# Patient Record
Sex: MALE | Race: WHITE | NOT HISPANIC OR LATINO | Employment: OTHER | URBAN - METROPOLITAN AREA
[De-identification: names, ages, dates, MRNs, and addresses within clinical notes are randomized per-mention and may not be internally consistent; named-entity substitution may affect disease eponyms.]

---

## 2017-02-22 ENCOUNTER — HOSPITAL ENCOUNTER (EMERGENCY)
Facility: HOSPITAL | Age: 82
Discharge: HOME | End: 2017-02-22
Attending: EMERGENCY MEDICINE | Admitting: EMERGENCY MEDICINE

## 2017-02-22 ENCOUNTER — APPOINTMENT (OUTPATIENT)
Dept: GENERAL RADIOLOGY | Facility: HOSPITAL | Age: 82
End: 2017-02-22

## 2017-02-22 ENCOUNTER — APPOINTMENT (OUTPATIENT)
Dept: CT IMAGING | Facility: HOSPITAL | Age: 82
End: 2017-02-22

## 2017-02-22 ENCOUNTER — APPOINTMENT (OUTPATIENT)
Dept: MRI IMAGING | Facility: HOSPITAL | Age: 82
End: 2017-02-22

## 2017-02-22 VITALS
SYSTOLIC BLOOD PRESSURE: 162 MMHG | BODY MASS INDEX: 25.18 KG/M2 | HEIGHT: 69 IN | OXYGEN SATURATION: 97 % | DIASTOLIC BLOOD PRESSURE: 74 MMHG | WEIGHT: 170 LBS | RESPIRATION RATE: 18 BRPM | TEMPERATURE: 97.6 F | HEART RATE: 65 BPM

## 2017-02-22 DIAGNOSIS — R53.1 WEAKNESS: ICD-10-CM
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
## 2017-02-22 DIAGNOSIS — R42 DIZZINESS: Primary | ICD-10-CM

## 2017-02-22 LAB
ALBUMIN SERPL-MCNC: 4.4 G/DL (ref 3.2–4.8)
ALBUMIN/GLOB SERPL: 1.7 G/DL (ref 1.5–2.5)
ALP SERPL-CCNC: 48 U/L (ref 25–100)
ALT SERPL W P-5'-P-CCNC: 13 U/L (ref 7–40)
ANION GAP SERPL CALCULATED.3IONS-SCNC: 7 MMOL/L (ref 3–11)
AST SERPL-CCNC: 16 U/L (ref 0–33)
BASOPHILS # BLD AUTO: 0.04 10*3/MM3 (ref 0–0.2)
BASOPHILS NFR BLD AUTO: 0.4 % (ref 0–1)
BILIRUB SERPL-MCNC: 0.6 MG/DL (ref 0.3–1.2)
BILIRUB UR QL STRIP: NEGATIVE
BUN BLD-MCNC: 13 MG/DL (ref 9–23)
BUN/CREAT SERPL: 14.4 (ref 7–25)
CALCIUM SPEC-SCNC: 9.7 MG/DL (ref 8.7–10.4)
CHLORIDE SERPL-SCNC: 100 MMOL/L (ref 99–109)
CLARITY UR: CLEAR
CO2 SERPL-SCNC: 27 MMOL/L (ref 20–31)
COLOR UR: YELLOW
CREAT BLD-MCNC: 0.9 MG/DL (ref 0.6–1.3)
DEPRECATED RDW RBC AUTO: 43 FL (ref 37–54)
EOSINOPHIL # BLD AUTO: 0.13 10*3/MM3 (ref 0.1–0.3)
EOSINOPHIL NFR BLD AUTO: 1.4 % (ref 0–3)
ERYTHROCYTE [DISTWIDTH] IN BLOOD BY AUTOMATED COUNT: 13.2 % (ref 11.3–14.5)
GFR SERPL CREATININE-BSD FRML MDRD: 81 ML/MIN/1.73
GLOBULIN UR ELPH-MCNC: 2.6 GM/DL
GLUCOSE BLD-MCNC: 216 MG/DL (ref 70–100)
GLUCOSE BLDC GLUCOMTR-MCNC: 144 MG/DL (ref 70–130)
GLUCOSE BLDC GLUCOMTR-MCNC: 99 MG/DL (ref 70–130)
GLUCOSE UR STRIP-MCNC: NEGATIVE MG/DL
HCT VFR BLD AUTO: 36.8 % (ref 38.9–50.9)
HGB BLD-MCNC: 12.7 G/DL (ref 13.1–17.5)
HGB UR QL STRIP.AUTO: NEGATIVE
HOLD SPECIMEN: NORMAL
HOLD SPECIMEN: NORMAL
IMM GRANULOCYTES # BLD: 0.02 10*3/MM3 (ref 0–0.03)
IMM GRANULOCYTES NFR BLD: 0.2 % (ref 0–0.6)
KETONES UR QL STRIP: NEGATIVE
LEUKOCYTE ESTERASE UR QL STRIP.AUTO: NEGATIVE
LYMPHOCYTES # BLD AUTO: 1.92 10*3/MM3 (ref 0.6–4.8)
LYMPHOCYTES NFR BLD AUTO: 21.3 % (ref 24–44)
MAGNESIUM SERPL-MCNC: 1.6 MG/DL (ref 1.3–2.7)
MCH RBC QN AUTO: 31 PG (ref 27–31)
MCHC RBC AUTO-ENTMCNC: 34.5 G/DL (ref 32–36)
MCV RBC AUTO: 89.8 FL (ref 80–99)
MONOCYTES # BLD AUTO: 0.54 10*3/MM3 (ref 0–1)
MONOCYTES NFR BLD AUTO: 6 % (ref 0–12)
NEUTROPHILS # BLD AUTO: 6.38 10*3/MM3 (ref 1.5–8.3)
NEUTROPHILS NFR BLD AUTO: 70.7 % (ref 41–71)
NITRITE UR QL STRIP: NEGATIVE
PH UR STRIP.AUTO: 5.5 [PH] (ref 5–8)
PLATELET # BLD AUTO: 237 10*3/MM3 (ref 150–450)
PMV BLD AUTO: 9.6 FL (ref 6–12)
POTASSIUM BLD-SCNC: 4.3 MMOL/L (ref 3.5–5.5)
PROT SERPL-MCNC: 7 G/DL (ref 5.7–8.2)
PROT UR QL STRIP: NEGATIVE
RBC # BLD AUTO: 4.1 10*6/MM3 (ref 4.2–5.76)
SODIUM BLD-SCNC: 134 MMOL/L (ref 132–146)
SP GR UR STRIP: 1.01 (ref 1–1.03)
TROPONIN I SERPL-MCNC: 0 NG/ML (ref 0–0.07)
TROPONIN I SERPL-MCNC: 0 NG/ML (ref 0–0.07)
UROBILINOGEN UR QL STRIP: NORMAL
WBC NRBC COR # BLD: 9.03 10*3/MM3 (ref 3.5–10.8)
WHOLE BLOOD HOLD SPECIMEN: NORMAL
WHOLE BLOOD HOLD SPECIMEN: NORMAL

## 2017-02-22 PROCEDURE — 82962 GLUCOSE BLOOD TEST: CPT

## 2017-02-22 PROCEDURE — 70450 CT HEAD/BRAIN W/O DYE: CPT

## 2017-02-22 PROCEDURE — 81003 URINALYSIS AUTO W/O SCOPE: CPT | Performed by: EMERGENCY MEDICINE

## 2017-02-22 PROCEDURE — 93005 ELECTROCARDIOGRAM TRACING: CPT | Performed by: EMERGENCY MEDICINE

## 2017-02-22 PROCEDURE — 85025 COMPLETE CBC W/AUTO DIFF WBC: CPT | Performed by: EMERGENCY MEDICINE

## 2017-02-22 PROCEDURE — 99285 EMERGENCY DEPT VISIT HI MDM: CPT

## 2017-02-22 PROCEDURE — 71010 HC CHEST PA OR AP: CPT

## 2017-02-22 PROCEDURE — 93005 ELECTROCARDIOGRAM TRACING: CPT

## 2017-02-22 PROCEDURE — 83735 ASSAY OF MAGNESIUM: CPT | Performed by: EMERGENCY MEDICINE

## 2017-02-22 PROCEDURE — 84484 ASSAY OF TROPONIN QUANT: CPT

## 2017-02-22 PROCEDURE — 80053 COMPREHEN METABOLIC PANEL: CPT | Performed by: EMERGENCY MEDICINE

## 2017-02-22 PROCEDURE — 70551 MRI BRAIN STEM W/O DYE: CPT

## 2017-02-22 RX ORDER — MECLIZINE HYDROCHLORIDE 25 MG/1
25 TABLET ORAL ONCE
Status: DISCONTINUED | OUTPATIENT
Start: 2017-02-22 | End: 2017-02-22 | Stop reason: HOSPADM

## 2017-02-22 RX ORDER — SODIUM CHLORIDE 0.9 % (FLUSH) 0.9 %
10 SYRINGE (ML) INJECTION AS NEEDED
Status: DISCONTINUED | OUTPATIENT
Start: 2017-02-22 | End: 2017-02-22 | Stop reason: HOSPADM

## 2017-02-22 NOTE — ED PROVIDER NOTES
Subjective   Patient is a 81 y.o. male presenting with syncope.   Syncope   Episode history:  Single  Most recent episode:  Today  Timing:  Sporadic  Progression:  Improving  Chronicity:  New  Context: standing up    Witnessed: yes    Relieved by:  Nothing  Worsened by:  Posture  Ineffective treatments:  None tried  Associated symptoms: dizziness    Associated symptoms: no anxiety, no chest pain, no diaphoresis, no difficulty breathing, no fever, no focal weakness, no headaches, no seizures and no shortness of breath        Review of Systems   Constitutional: Negative for diaphoresis and fever.   Respiratory: Negative for shortness of breath.    Cardiovascular: Positive for syncope. Negative for chest pain.   Neurological: Positive for dizziness. Negative for focal weakness, seizures and headaches.   All other systems reviewed and are negative.      Past Medical History   Diagnosis Date   • Arthritis    • Diabetes mellitus    • Kidney stone        No Known Allergies    Past Surgical History   Procedure Laterality Date   • Back surgery     • Cholecystectomy         History reviewed. No pertinent family history.    Social History     Social History   • Marital status:      Spouse name: N/A   • Number of children: N/A   • Years of education: N/A     Social History Main Topics   • Smoking status: Former Smoker     Quit date: 2/22/1980   • Smokeless tobacco: None   • Alcohol use Yes   • Drug use: No   • Sexual activity: Defer     Other Topics Concern   • None     Social History Narrative   • None           Objective   Physical Exam   Constitutional: He is oriented to person, place, and time. He appears well-developed and well-nourished.   HENT:   Head: Normocephalic and atraumatic.   Right Ear: External ear normal.   Left Ear: External ear normal.   Nose: Nose normal.   Mouth/Throat: Oropharynx is clear and moist.   Eyes: Conjunctivae and EOM are normal. Pupils are equal, round, and reactive to light.   Neck:  Normal range of motion. Neck supple.   Cardiovascular: Normal rate, regular rhythm, normal heart sounds and intact distal pulses.    Pulmonary/Chest: Effort normal and breath sounds normal.   Abdominal: Soft. Bowel sounds are normal.   Musculoskeletal: Normal range of motion.   Neurological: He is alert and oriented to person, place, and time.   Skin: Skin is warm and dry.   Psychiatric: He has a normal mood and affect. His behavior is normal. Judgment normal.       Procedures         ED Course  ED Course   Comment By Time   Pt feels fine. Back to nml. Wants to go home. Declines admission. Will fu with pcp this week. Reassuring labs, ekg and mri.    All thankful and agreeable. Well aware of the ss of worsening condition. JOANNA Vidal 02/22 1900                  MDM    Final diagnoses:   Dizziness   Fall, initial encounter   Weakness            JOANNA Vidal  02/22/17 1907

## 2017-04-03 ENCOUNTER — TRANSCRIBE ORDERS (OUTPATIENT)
Dept: ADMINISTRATIVE | Facility: HOSPITAL | Age: 82
End: 2017-04-03

## 2017-04-03 DIAGNOSIS — R55 SYNCOPE AND COLLAPSE: Primary | ICD-10-CM

## 2018-01-01 ENCOUNTER — APPOINTMENT (OUTPATIENT)
Dept: CARDIOLOGY | Facility: HOSPITAL | Age: 83
End: 2018-01-01

## 2018-01-01 ENCOUNTER — HOSPITAL ENCOUNTER (OUTPATIENT)
Facility: HOSPITAL | Age: 83
Setting detail: OBSERVATION
Discharge: HOME | End: 2018-08-10
Attending: EMERGENCY MEDICINE | Admitting: EMERGENCY MEDICINE

## 2018-01-01 ENCOUNTER — APPOINTMENT (OUTPATIENT)
Dept: GENERAL RADIOLOGY | Facility: HOSPITAL | Age: 83
End: 2018-01-01

## 2018-01-01 ENCOUNTER — READMISSION MANAGEMENT (OUTPATIENT)
Dept: CALL CENTER | Facility: HOSPITAL | Age: 83
End: 2018-01-01

## 2018-01-01 ENCOUNTER — APPOINTMENT (OUTPATIENT)
Dept: CT IMAGING | Facility: HOSPITAL | Age: 83
End: 2018-01-01

## 2018-01-01 VITALS
DIASTOLIC BLOOD PRESSURE: 66 MMHG | BODY MASS INDEX: 23.19 KG/M2 | RESPIRATION RATE: 20 BRPM | OXYGEN SATURATION: 95 % | WEIGHT: 162 LBS | TEMPERATURE: 98.6 F | SYSTOLIC BLOOD PRESSURE: 149 MMHG | HEART RATE: 71 BPM | HEIGHT: 70 IN

## 2018-01-01 DIAGNOSIS — R55 NEAR SYNCOPE: ICD-10-CM

## 2018-01-01 DIAGNOSIS — R42 DIZZINESS: Primary | ICD-10-CM

## 2018-01-01 DIAGNOSIS — R07.9 CHEST PAIN, UNSPECIFIED TYPE: ICD-10-CM

## 2018-01-01 LAB
ALBUMIN SERPL-MCNC: 3.87 G/DL (ref 3.2–4.8)
ALBUMIN/GLOB SERPL: 1.6 G/DL (ref 1.5–2.5)
ALP SERPL-CCNC: 57 U/L (ref 25–100)
ALT SERPL W P-5'-P-CCNC: 15 U/L (ref 7–40)
ANION GAP SERPL CALCULATED.3IONS-SCNC: 13 MMOL/L (ref 3–11)
ANION GAP SERPL CALCULATED.3IONS-SCNC: 9 MMOL/L (ref 3–11)
ARTICHOKE IGE QN: 64 MG/DL (ref 0–130)
AST SERPL-CCNC: 12 U/L (ref 0–33)
BACTERIA UR QL AUTO: NORMAL /HPF
BASOPHILS # BLD AUTO: 0.04 10*3/MM3 (ref 0–0.2)
BASOPHILS # BLD AUTO: 0.05 10*3/MM3 (ref 0–0.2)
BASOPHILS NFR BLD AUTO: 0.4 % (ref 0–1)
BASOPHILS NFR BLD AUTO: 0.4 % (ref 0–1)
BH CV ECHO MEAS - AO ROOT AREA (BSA CORRECTED): 1.9
BH CV ECHO MEAS - AO ROOT AREA: 10.8 CM^2
BH CV ECHO MEAS - AO ROOT DIAM: 3.7 CM
BH CV ECHO MEAS - BSA(HAYCOCK): 1.9 M^2
BH CV ECHO MEAS - BSA(HAYCOCK): 1.9 M^2
BH CV ECHO MEAS - BSA: 1.9 M^2
BH CV ECHO MEAS - BSA: 1.9 M^2
BH CV ECHO MEAS - BZI_BMI: 23.2 KILOGRAMS/M^2
BH CV ECHO MEAS - BZI_BMI: 23.2 KILOGRAMS/M^2
BH CV ECHO MEAS - BZI_METRIC_HEIGHT: 177.8 CM
BH CV ECHO MEAS - BZI_METRIC_HEIGHT: 177.8 CM
BH CV ECHO MEAS - BZI_METRIC_WEIGHT: 73.5 KG
BH CV ECHO MEAS - BZI_METRIC_WEIGHT: 73.5 KG
BH CV ECHO MEAS - CONTRAST EF (2CH): 28.6 ML/M^2
BH CV ECHO MEAS - CONTRAST EF 4CH: 45.2 ML/M^2
BH CV ECHO MEAS - EDV(CUBED): 49.1 ML
BH CV ECHO MEAS - EDV(MOD-SP2): 56 ML
BH CV ECHO MEAS - EDV(MOD-SP4): 104 ML
BH CV ECHO MEAS - EDV(TEICH): 56.7 ML
BH CV ECHO MEAS - EF(CUBED): 53.2 %
BH CV ECHO MEAS - EF(MOD-SP2): 28.6 %
BH CV ECHO MEAS - EF(MOD-SP4): 45.2 %
BH CV ECHO MEAS - EF(TEICH): 45.9 %
BH CV ECHO MEAS - ESV(CUBED): 23 ML
BH CV ECHO MEAS - ESV(MOD-SP2): 40 ML
BH CV ECHO MEAS - ESV(MOD-SP4): 57 ML
BH CV ECHO MEAS - ESV(TEICH): 30.7 ML
BH CV ECHO MEAS - FS: 22.4 %
BH CV ECHO MEAS - IVS/LVPW: 1.1
BH CV ECHO MEAS - IVSD: 1.7 CM
BH CV ECHO MEAS - LA DIMENSION: 4.2 CM
BH CV ECHO MEAS - LA/AO: 1.1
BH CV ECHO MEAS - LAT PEAK E' VEL: 6.9 CM/SEC
BH CV ECHO MEAS - LV DIASTOLIC VOL/BSA (35-75): 54.5 ML/M^2
BH CV ECHO MEAS - LV MASS(C)D: 243.2 GRAMS
BH CV ECHO MEAS - LV MASS(C)DI: 127.4 GRAMS/M^2
BH CV ECHO MEAS - LV MAX PG: 4.4 MMHG
BH CV ECHO MEAS - LV MEAN PG: 2.4 MMHG
BH CV ECHO MEAS - LV SYSTOLIC VOL/BSA (12-30): 29.9 ML/M^2
BH CV ECHO MEAS - LV V1 MAX: 104.6 CM/SEC
BH CV ECHO MEAS - LV V1 MEAN: 71.9 CM/SEC
BH CV ECHO MEAS - LV V1 VTI: 23.7 CM
BH CV ECHO MEAS - LVIDD: 3.7 CM
BH CV ECHO MEAS - LVIDS: 2.8 CM
BH CV ECHO MEAS - LVLD AP2: 7.5 CM
BH CV ECHO MEAS - LVLD AP4: 8.2 CM
BH CV ECHO MEAS - LVLS AP2: 7.2 CM
BH CV ECHO MEAS - LVLS AP4: 7.2 CM
BH CV ECHO MEAS - LVOT AREA (M): 2.8 CM^2
BH CV ECHO MEAS - LVOT AREA: 3 CM^2
BH CV ECHO MEAS - LVOT DIAM: 1.9 CM
BH CV ECHO MEAS - LVPWD: 1.6 CM
BH CV ECHO MEAS - MED PEAK E' VEL: 4.48 CM/SEC
BH CV ECHO MEAS - MV A MAX VEL: 148.6 CM/SEC
BH CV ECHO MEAS - MV E MAX VEL: 116.5 CM/SEC
BH CV ECHO MEAS - MV E/A: 0.78
BH CV ECHO MEAS - PA ACC SLOPE: 777.9 CM/SEC^2
BH CV ECHO MEAS - PA ACC TIME: 0.1 SEC
BH CV ECHO MEAS - PA PR(ACCEL): 35 MMHG
BH CV ECHO MEAS - RVDD: 3.2 CM
BH CV ECHO MEAS - SI(CUBED): 13.7 ML/M^2
BH CV ECHO MEAS - SI(LVOT): 37 ML/M^2
BH CV ECHO MEAS - SI(MOD-SP2): 8.4 ML/M^2
BH CV ECHO MEAS - SI(MOD-SP4): 24.6 ML/M^2
BH CV ECHO MEAS - SI(TEICH): 13.6 ML/M^2
BH CV ECHO MEAS - SV(CUBED): 26.2 ML
BH CV ECHO MEAS - SV(LVOT): 70.6 ML
BH CV ECHO MEAS - SV(MOD-SP2): 16 ML
BH CV ECHO MEAS - SV(MOD-SP4): 47 ML
BH CV ECHO MEAS - SV(TEICH): 26 ML
BH CV ECHO MEAS - TAPSE (>1.6): 2.2 CM2
BH CV ECHO MEASUREMENTS AVERAGE E/E' RATIO: 20.47
BH CV XLRA - RV BASE: 3.2 CM
BH CV XLRA - RV LENGTH: 6.3 CM
BH CV XLRA - RV MID: 2.5 CM
BH CV XLRA - TDI S': 13.5 CM/SEC
BH CV XLRA MEAS LEFT CCA RATIO VEL: 99.5 CM/SEC
BH CV XLRA MEAS LEFT DIST CCA EDV: 13.1 CM/SEC
BH CV XLRA MEAS LEFT DIST CCA PSV: 77.7 CM/SEC
BH CV XLRA MEAS LEFT ICA RATIO VEL: 86.4 CM/SEC
BH CV XLRA MEAS LEFT ICA/CCA RATIO: 0.87
BH CV XLRA MEAS LEFT MID CCA EDV: 16.6 CM/SEC
BH CV XLRA MEAS LEFT MID CCA PSV: 100.4 CM/SEC
BH CV XLRA MEAS LEFT MID ICA EDV: 22.1 CM/SEC
BH CV XLRA MEAS LEFT MID ICA PSV: 86.9 CM/SEC
BH CV XLRA MEAS LEFT PROX CCA EDV: 20.2 CM/SEC
BH CV XLRA MEAS LEFT PROX CCA PSV: 104.4 CM/SEC
BH CV XLRA MEAS LEFT PROX ECA PSV: 67.3 CM/SEC
BH CV XLRA MEAS LEFT PROX ICA EDV: 15.7 CM/SEC
BH CV XLRA MEAS LEFT PROX ICA PSV: 67.8 CM/SEC
BH CV XLRA MEAS LEFT PROX SCLA PSV: 149.3 CM/SEC
BH CV XLRA MEAS RIGHT CCA RATIO VEL: 75.4 CM/SEC
BH CV XLRA MEAS RIGHT DIST CCA EDV: 14.9 CM/SEC
BH CV XLRA MEAS RIGHT DIST CCA PSV: 73.1 CM/SEC
BH CV XLRA MEAS RIGHT DIST ICA EDV: 22 CM/SEC
BH CV XLRA MEAS RIGHT DIST ICA PSV: 92.7 CM/SEC
BH CV XLRA MEAS RIGHT ICA RATIO VEL: 91.9 CM/SEC
BH CV XLRA MEAS RIGHT ICA/CCA RATIO: 1.2
BH CV XLRA MEAS RIGHT MID CCA EDV: 18.9 CM/SEC
BH CV XLRA MEAS RIGHT MID CCA PSV: 76.2 CM/SEC
BH CV XLRA MEAS RIGHT MID ICA EDV: 19.6 CM/SEC
BH CV XLRA MEAS RIGHT MID ICA PSV: 75.4 CM/SEC
BH CV XLRA MEAS RIGHT PROX CCA EDV: 14 CM/SEC
BH CV XLRA MEAS RIGHT PROX CCA PSV: 88.2 CM/SEC
BH CV XLRA MEAS RIGHT PROX ECA PSV: 108.4 CM/SEC
BH CV XLRA MEAS RIGHT PROX ICA EDV: 14.1 CM/SEC
BH CV XLRA MEAS RIGHT PROX ICA PSV: 71.5 CM/SEC
BH CV XLRA MEAS RIGHT PROX SCLA PSV: 112.6 CM/SEC
BH CV XLRA MEAS RIGHT VERTEBRAL A PSV: 60.5 CM/SEC
BILIRUB SERPL-MCNC: 0.4 MG/DL (ref 0.3–1.2)
BILIRUB UR QL STRIP: NEGATIVE
BNP SERPL-MCNC: 85 PG/ML (ref 0–100)
BUN BLD-MCNC: 11 MG/DL (ref 9–23)
BUN BLD-MCNC: 12 MG/DL (ref 9–23)
BUN/CREAT SERPL: 11 (ref 7–25)
BUN/CREAT SERPL: 12.1 (ref 7–25)
CALCIUM SPEC-SCNC: 8.2 MG/DL (ref 8.7–10.4)
CALCIUM SPEC-SCNC: 8.5 MG/DL (ref 8.7–10.4)
CHLORIDE SERPL-SCNC: 100 MMOL/L (ref 99–109)
CHLORIDE SERPL-SCNC: 103 MMOL/L (ref 99–109)
CHOLEST SERPL-MCNC: 120 MG/DL (ref 0–200)
CLARITY UR: ABNORMAL
CO2 SERPL-SCNC: 22 MMOL/L (ref 20–31)
CO2 SERPL-SCNC: 24 MMOL/L (ref 20–31)
COLOR UR: YELLOW
CREAT BLD-MCNC: 0.99 MG/DL (ref 0.6–1.3)
CREAT BLD-MCNC: 1 MG/DL (ref 0.6–1.3)
DEPRECATED RDW RBC AUTO: 43.5 FL (ref 37–54)
DEPRECATED RDW RBC AUTO: 44 FL (ref 37–54)
EOSINOPHIL # BLD AUTO: 0.13 10*3/MM3 (ref 0–0.3)
EOSINOPHIL # BLD AUTO: 0.21 10*3/MM3 (ref 0–0.3)
EOSINOPHIL NFR BLD AUTO: 1 % (ref 0–3)
EOSINOPHIL NFR BLD AUTO: 1.9 % (ref 0–3)
ERYTHROCYTE [DISTWIDTH] IN BLOOD BY AUTOMATED COUNT: 13.4 % (ref 11.3–14.5)
ERYTHROCYTE [DISTWIDTH] IN BLOOD BY AUTOMATED COUNT: 13.4 % (ref 11.3–14.5)
FERRITIN SERPL-MCNC: 103 NG/ML (ref 22–322)
FOLATE BLD-MCNC: 540.2 NG/ML
FOLATE RBC-MCNC: 1662 NG/ML
FOLATE SERPL-MCNC: 19.94 NG/ML (ref 3.2–20)
GFR SERPL CREATININE-BSD FRML MDRD: 72 ML/MIN/1.73
GFR SERPL CREATININE-BSD FRML MDRD: 73 ML/MIN/1.73
GLOBULIN UR ELPH-MCNC: 2.4 GM/DL
GLUCOSE BLD-MCNC: 276 MG/DL (ref 70–100)
GLUCOSE BLD-MCNC: 325 MG/DL (ref 70–100)
GLUCOSE BLDC GLUCOMTR-MCNC: 207 MG/DL (ref 70–130)
GLUCOSE UR STRIP-MCNC: ABNORMAL MG/DL
HBA1C MFR BLD: 9.2 % (ref 4.8–5.6)
HCT VFR BLD AUTO: 32.1 % (ref 38.9–50.9)
HCT VFR BLD AUTO: 32.5 % (ref 37.5–51)
HCT VFR BLD AUTO: 32.5 % (ref 38.9–50.9)
HDLC SERPL-MCNC: 43 MG/DL (ref 40–60)
HGB BLD-MCNC: 10.9 G/DL (ref 13.1–17.5)
HGB BLD-MCNC: 11 G/DL (ref 13.1–17.5)
HGB UR QL STRIP.AUTO: NEGATIVE
HOLD SPECIMEN: NORMAL
HOLD SPECIMEN: NORMAL
HYALINE CASTS UR QL AUTO: NORMAL /LPF
IMM GRANULOCYTES # BLD: 0.05 10*3/MM3 (ref 0–0.03)
IMM GRANULOCYTES # BLD: 0.06 10*3/MM3 (ref 0–0.03)
IMM GRANULOCYTES NFR BLD: 0.4 % (ref 0–0.6)
IMM GRANULOCYTES NFR BLD: 0.6 % (ref 0–0.6)
IRON 24H UR-MRATE: 19 MCG/DL (ref 50–175)
IRON SATN MFR SERPL: 7 % (ref 20–50)
KETONES UR QL STRIP: NEGATIVE
LEUKOCYTE ESTERASE UR QL STRIP.AUTO: NEGATIVE
LIPASE SERPL-CCNC: 78 U/L (ref 6–51)
LV EF 2D ECHO EST: 50 %
LYMPHOCYTES # BLD AUTO: 2.17 10*3/MM3 (ref 0.6–4.8)
LYMPHOCYTES # BLD AUTO: 2.22 10*3/MM3 (ref 0.6–4.8)
LYMPHOCYTES NFR BLD AUTO: 17.4 % (ref 24–44)
LYMPHOCYTES NFR BLD AUTO: 19.9 % (ref 24–44)
MAXIMAL PREDICTED HEART RATE: 139 BPM
MCH RBC QN AUTO: 30.2 PG (ref 27–31)
MCH RBC QN AUTO: 30.5 PG (ref 27–31)
MCHC RBC AUTO-ENTMCNC: 33.8 G/DL (ref 32–36)
MCHC RBC AUTO-ENTMCNC: 34 G/DL (ref 32–36)
MCV RBC AUTO: 89.3 FL (ref 80–99)
MCV RBC AUTO: 89.9 FL (ref 80–99)
MONOCYTES # BLD AUTO: 1.24 10*3/MM3 (ref 0–1)
MONOCYTES # BLD AUTO: 1.52 10*3/MM3 (ref 0–1)
MONOCYTES NFR BLD AUTO: 11.4 % (ref 0–12)
MONOCYTES NFR BLD AUTO: 11.9 % (ref 0–12)
NEUTROPHILS # BLD AUTO: 7.18 10*3/MM3 (ref 1.5–8.3)
NEUTROPHILS # BLD AUTO: 8.79 10*3/MM3 (ref 1.5–8.3)
NEUTROPHILS NFR BLD AUTO: 65.8 % (ref 41–71)
NEUTROPHILS NFR BLD AUTO: 68.9 % (ref 41–71)
NITRITE UR QL STRIP: NEGATIVE
PH UR STRIP.AUTO: 5.5 [PH] (ref 5–8)
PLATELET # BLD AUTO: 137 10*3/MM3 (ref 150–450)
PLATELET # BLD AUTO: 157 10*3/MM3 (ref 150–450)
PMV BLD AUTO: 8.9 FL (ref 6–12)
PMV BLD AUTO: 9.5 FL (ref 6–12)
POTASSIUM BLD-SCNC: 3.8 MMOL/L (ref 3.5–5.5)
POTASSIUM BLD-SCNC: 4 MMOL/L (ref 3.5–5.5)
PROT SERPL-MCNC: 6.3 G/DL (ref 5.7–8.2)
PROT UR QL STRIP: NEGATIVE
RBC # BLD AUTO: 3.57 10*6/MM3 (ref 4.2–5.76)
RBC # BLD AUTO: 3.64 10*6/MM3 (ref 4.2–5.76)
RBC # UR: NORMAL /HPF
REF LAB TEST METHOD: NORMAL
RETICS/RBC NFR AUTO: 2.12 % (ref 0.5–1.5)
RIGHT ARM BP: NORMAL MMHG
SODIUM BLD-SCNC: 134 MMOL/L (ref 132–146)
SODIUM BLD-SCNC: 137 MMOL/L (ref 132–146)
SP GR UR STRIP: 1.02 (ref 1–1.03)
SQUAMOUS #/AREA URNS HPF: NORMAL /HPF
STRESS TARGET HR: 118 BPM
TIBC SERPL-MCNC: 290 MCG/DL (ref 250–450)
TRIGL SERPL-MCNC: 130 MG/DL (ref 0–150)
TROPONIN I SERPL-MCNC: 0 NG/ML (ref 0–0.07)
TROPONIN I SERPL-MCNC: 0 NG/ML (ref 0–0.07)
TROPONIN I SERPL-MCNC: 0.01 NG/ML
TSH SERPL DL<=0.05 MIU/L-ACNC: 2.51 MIU/ML (ref 0.35–5.35)
UROBILINOGEN UR QL STRIP: ABNORMAL
VIT B12 BLD-MCNC: 482 PG/ML (ref 211–911)
WBC NRBC COR # BLD: 10.9 10*3/MM3 (ref 3.5–10.8)
WBC NRBC COR # BLD: 12.76 10*3/MM3 (ref 3.5–10.8)
WBC UR QL AUTO: NORMAL /HPF
WHOLE BLOOD HOLD SPECIMEN: NORMAL
WHOLE BLOOD HOLD SPECIMEN: NORMAL

## 2018-01-01 PROCEDURE — 93005 ELECTROCARDIOGRAM TRACING: CPT | Performed by: EMERGENCY MEDICINE

## 2018-01-01 PROCEDURE — 85045 AUTOMATED RETICULOCYTE COUNT: CPT | Performed by: NURSE PRACTITIONER

## 2018-01-01 PROCEDURE — 93005 ELECTROCARDIOGRAM TRACING: CPT | Performed by: NURSE PRACTITIONER

## 2018-01-01 PROCEDURE — 63710000001 INSULIN LISPRO (HUMAN) PER 5 UNITS: Performed by: NURSE PRACTITIONER

## 2018-01-01 PROCEDURE — 25010000002 SULFUR HEXAFLUORIDE MICROSPH 60.7-25 MG RECONSTITUTED SUSPENSION: Performed by: INTERNAL MEDICINE

## 2018-01-01 PROCEDURE — 93010 ELECTROCARDIOGRAM REPORT: CPT | Performed by: INTERNAL MEDICINE

## 2018-01-01 PROCEDURE — 85014 HEMATOCRIT: CPT | Performed by: NURSE PRACTITIONER

## 2018-01-01 PROCEDURE — 82607 VITAMIN B-12: CPT | Performed by: NURSE PRACTITIONER

## 2018-01-01 PROCEDURE — G0378 HOSPITAL OBSERVATION PER HR: HCPCS

## 2018-01-01 PROCEDURE — 80061 LIPID PANEL: CPT | Performed by: NURSE PRACTITIONER

## 2018-01-01 PROCEDURE — 71046 X-RAY EXAM CHEST 2 VIEWS: CPT

## 2018-01-01 PROCEDURE — 93880 EXTRACRANIAL BILAT STUDY: CPT | Performed by: INTERNAL MEDICINE

## 2018-01-01 PROCEDURE — 84484 ASSAY OF TROPONIN QUANT: CPT

## 2018-01-01 PROCEDURE — 83550 IRON BINDING TEST: CPT | Performed by: NURSE PRACTITIONER

## 2018-01-01 PROCEDURE — 73030 X-RAY EXAM OF SHOULDER: CPT

## 2018-01-01 PROCEDURE — 84484 ASSAY OF TROPONIN QUANT: CPT | Performed by: NURSE PRACTITIONER

## 2018-01-01 PROCEDURE — 82962 GLUCOSE BLOOD TEST: CPT

## 2018-01-01 PROCEDURE — 99217 PR OBSERVATION CARE DISCHARGE MANAGEMENT: CPT | Performed by: INTERNAL MEDICINE

## 2018-01-01 PROCEDURE — 83540 ASSAY OF IRON: CPT | Performed by: NURSE PRACTITIONER

## 2018-01-01 PROCEDURE — 25010000002 HEPARIN (PORCINE) PER 1000 UNITS: Performed by: NURSE PRACTITIONER

## 2018-01-01 PROCEDURE — 71045 X-RAY EXAM CHEST 1 VIEW: CPT

## 2018-01-01 PROCEDURE — 84443 ASSAY THYROID STIM HORMONE: CPT | Performed by: NURSE PRACTITIONER

## 2018-01-01 PROCEDURE — 93880 EXTRACRANIAL BILAT STUDY: CPT

## 2018-01-01 PROCEDURE — 81001 URINALYSIS AUTO W/SCOPE: CPT | Performed by: PHYSICIAN ASSISTANT

## 2018-01-01 PROCEDURE — 82746 ASSAY OF FOLIC ACID SERUM: CPT | Performed by: NURSE PRACTITIONER

## 2018-01-01 PROCEDURE — 96372 THER/PROPH/DIAG INJ SC/IM: CPT

## 2018-01-01 PROCEDURE — 96360 HYDRATION IV INFUSION INIT: CPT

## 2018-01-01 PROCEDURE — 85025 COMPLETE CBC W/AUTO DIFF WBC: CPT | Performed by: EMERGENCY MEDICINE

## 2018-01-01 PROCEDURE — 80048 BASIC METABOLIC PNL TOTAL CA: CPT | Performed by: NURSE PRACTITIONER

## 2018-01-01 PROCEDURE — 83036 HEMOGLOBIN GLYCOSYLATED A1C: CPT | Performed by: NURSE PRACTITIONER

## 2018-01-01 PROCEDURE — 99285 EMERGENCY DEPT VISIT HI MDM: CPT

## 2018-01-01 PROCEDURE — 80053 COMPREHEN METABOLIC PANEL: CPT | Performed by: EMERGENCY MEDICINE

## 2018-01-01 PROCEDURE — 82747 ASSAY OF FOLIC ACID RBC: CPT | Performed by: NURSE PRACTITIONER

## 2018-01-01 PROCEDURE — 70450 CT HEAD/BRAIN W/O DYE: CPT

## 2018-01-01 PROCEDURE — 99220 PR INITIAL OBSERVATION CARE/DAY 70 MINUTES: CPT | Performed by: INTERNAL MEDICINE

## 2018-01-01 PROCEDURE — 83880 ASSAY OF NATRIURETIC PEPTIDE: CPT | Performed by: EMERGENCY MEDICINE

## 2018-01-01 PROCEDURE — 93306 TTE W/DOPPLER COMPLETE: CPT | Performed by: INTERNAL MEDICINE

## 2018-01-01 PROCEDURE — 93306 TTE W/DOPPLER COMPLETE: CPT

## 2018-01-01 PROCEDURE — 83690 ASSAY OF LIPASE: CPT | Performed by: EMERGENCY MEDICINE

## 2018-01-01 PROCEDURE — 82728 ASSAY OF FERRITIN: CPT | Performed by: NURSE PRACTITIONER

## 2018-01-01 PROCEDURE — 85025 COMPLETE CBC W/AUTO DIFF WBC: CPT | Performed by: NURSE PRACTITIONER

## 2018-01-01 RX ORDER — CETIRIZINE HYDROCHLORIDE 10 MG/1
10 TABLET ORAL DAILY
COMMUNITY
End: 2018-01-01 | Stop reason: HOSPADM

## 2018-01-01 RX ORDER — HEPARIN SODIUM 5000 [USP'U]/ML
5000 INJECTION, SOLUTION INTRAVENOUS; SUBCUTANEOUS EVERY 8 HOURS SCHEDULED
Status: DISCONTINUED | OUTPATIENT
Start: 2018-01-01 | End: 2018-01-01 | Stop reason: HOSPADM

## 2018-01-01 RX ORDER — ACETAMINOPHEN 325 MG/1
650 TABLET ORAL EVERY 6 HOURS PRN
Status: DISCONTINUED | OUTPATIENT
Start: 2018-01-01 | End: 2018-01-01 | Stop reason: HOSPADM

## 2018-01-01 RX ORDER — SODIUM CHLORIDE 9 MG/ML
75 INJECTION, SOLUTION INTRAVENOUS ONCE
Status: COMPLETED | OUTPATIENT
Start: 2018-01-01 | End: 2018-01-01

## 2018-01-01 RX ORDER — LEVOFLOXACIN 500 MG/1
500 TABLET, FILM COATED ORAL DAILY
Qty: 7 TABLET | Refills: 0 | Status: SHIPPED | OUTPATIENT
Start: 2018-01-01 | End: 2018-01-01

## 2018-01-01 RX ORDER — SODIUM CHLORIDE 0.9 % (FLUSH) 0.9 %
10 SYRINGE (ML) INJECTION AS NEEDED
Status: DISCONTINUED | OUTPATIENT
Start: 2018-01-01 | End: 2018-01-01 | Stop reason: HOSPADM

## 2018-01-01 RX ORDER — ESCITALOPRAM OXALATE 10 MG/1
20 TABLET ORAL NIGHTLY
Status: ON HOLD | COMMUNITY
End: 2019-01-01

## 2018-01-01 RX ORDER — ATORVASTATIN CALCIUM 10 MG/1
10 TABLET, FILM COATED ORAL DAILY
Status: DISCONTINUED | OUTPATIENT
Start: 2018-01-01 | End: 2018-01-01 | Stop reason: HOSPADM

## 2018-01-01 RX ORDER — SODIUM CHLORIDE 0.9 % (FLUSH) 0.9 %
1-10 SYRINGE (ML) INJECTION AS NEEDED
Status: DISCONTINUED | OUTPATIENT
Start: 2018-01-01 | End: 2018-01-01 | Stop reason: HOSPADM

## 2018-01-01 RX ORDER — MEMANTINE HYDROCHLORIDE 10 MG/1
10 TABLET ORAL DAILY
Status: DISCONTINUED | OUTPATIENT
Start: 2018-01-01 | End: 2018-01-01 | Stop reason: HOSPADM

## 2018-01-01 RX ORDER — NICOTINE POLACRILEX 4 MG
15 LOZENGE BUCCAL
Status: DISCONTINUED | OUTPATIENT
Start: 2018-01-01 | End: 2018-01-01 | Stop reason: HOSPADM

## 2018-01-01 RX ORDER — ESCITALOPRAM OXALATE 20 MG/1
10 TABLET ORAL DAILY
Status: DISCONTINUED | OUTPATIENT
Start: 2018-01-01 | End: 2018-01-01 | Stop reason: HOSPADM

## 2018-01-01 RX ORDER — ASPIRIN 81 MG/1
324 TABLET, CHEWABLE ORAL ONCE
Status: DISCONTINUED | OUTPATIENT
Start: 2018-01-01 | End: 2018-01-01 | Stop reason: HOSPADM

## 2018-01-01 RX ORDER — DEXTROSE MONOHYDRATE 25 G/50ML
25 INJECTION, SOLUTION INTRAVENOUS
Status: DISCONTINUED | OUTPATIENT
Start: 2018-01-01 | End: 2018-01-01 | Stop reason: HOSPADM

## 2018-01-01 RX ORDER — ASPIRIN 81 MG/1
81 TABLET, CHEWABLE ORAL DAILY
Status: DISCONTINUED | OUTPATIENT
Start: 2018-01-01 | End: 2018-01-01 | Stop reason: HOSPADM

## 2018-01-01 RX ORDER — MEMANTINE HYDROCHLORIDE 10 MG/1
10 TABLET ORAL DAILY
COMMUNITY

## 2018-01-01 RX ORDER — ATORVASTATIN CALCIUM 10 MG/1
10 TABLET, FILM COATED ORAL DAILY
Status: ON HOLD | COMMUNITY
End: 2018-01-01

## 2018-01-01 RX ORDER — ATORVASTATIN CALCIUM 10 MG/1
20 TABLET, FILM COATED ORAL DAILY
Qty: 30 TABLET | Refills: 6 | Status: ON HOLD | OUTPATIENT
Start: 2018-01-01 | End: 2019-01-01

## 2018-01-01 RX ORDER — MULTIVITAMIN/IRON/FOLIC ACID 18MG-0.4MG
TABLET ORAL
COMMUNITY
End: 2019-01-01

## 2018-01-01 RX ADMIN — ASPIRIN 81 MG CHEWABLE TABLET 81 MG: 81 TABLET CHEWABLE at 09:43

## 2018-01-01 RX ADMIN — HEPARIN SODIUM 5000 UNITS: 5000 INJECTION, SOLUTION INTRAVENOUS; SUBCUTANEOUS at 06:19

## 2018-01-01 RX ADMIN — ESCITALOPRAM OXALATE 10 MG: 20 TABLET ORAL at 09:44

## 2018-01-01 RX ADMIN — INSULIN LISPRO 3 UNITS: 100 INJECTION, SOLUTION INTRAVENOUS; SUBCUTANEOUS at 09:43

## 2018-01-01 RX ADMIN — SODIUM CHLORIDE 500 ML: 9 INJECTION, SOLUTION INTRAVENOUS at 18:27

## 2018-01-01 RX ADMIN — ATORVASTATIN CALCIUM 10 MG: 10 TABLET, FILM COATED ORAL at 09:44

## 2018-01-01 RX ADMIN — SODIUM CHLORIDE 75 ML/HR: 9 INJECTION, SOLUTION INTRAVENOUS at 01:00

## 2018-01-01 RX ADMIN — SULFUR HEXAFLUORIDE 3 ML: KIT at 13:00

## 2018-01-01 RX ADMIN — MEMANTINE 10 MG: 10 TABLET ORAL at 09:43

## 2018-08-09 PROBLEM — D72.829 LEUKOCYTOSIS: Status: ACTIVE | Noted: 2018-01-01

## 2018-08-09 PROBLEM — R55 NEAR SYNCOPE: Status: ACTIVE | Noted: 2018-01-01

## 2018-08-09 PROBLEM — R07.9 CHEST PAIN: Status: ACTIVE | Noted: 2018-01-01

## 2018-08-09 PROBLEM — I95.9 HYPOTENSION: Status: ACTIVE | Noted: 2018-01-01

## 2018-08-09 PROBLEM — F02.80 ALZHEIMER DISEASE (HCC): Status: ACTIVE | Noted: 2018-01-01

## 2018-08-09 PROBLEM — G30.9 ALZHEIMER DISEASE (HCC): Status: ACTIVE | Noted: 2018-01-01

## 2018-08-09 NOTE — ED PROVIDER NOTES
Subjective   Nico Ferreira is a 83 y.o.male who presents to the ED for an episode of chest pain. The patient's wife says that when she got home the patient said that his chest and right arm were hurting. He was pale and diaphoretic and he said he needed to go to the ER. He also was having upper back pain and dizziness. When the ambulance arrived, he was hypotensive with a recorded blood pressure around 70/40. Upon arriving to the ED, the patient says that he does not know why he is here and that he feels fine outside of having right shoulder pain. Pt does have hx of Alzheimers dementia and is a poor historian. Family notes he is back to baseline currently in ED. He denies headache, numbness, tingling, or weakness to UE or LE. He currently denies any fever, CP, SOB, N,V,D, abd pain, urinary sx or leg swelling. No reports of recent admissions but was given Amoxicillin for sinus infection about a week ago. Wife states Mucinex has helped more than the antibiotics. Wife notes he ate a normal breakfast but did not want to eat lunch. He has significant hx for DM, COPD, Neuropathy, Dementia, Anxiety,       .             History provided by:  Patient  History limited by:  Dementia  Chest Pain   Pain location:  Unable to specify  Pain radiates to:  R arm  Pain severity:  Moderate  Onset quality:  Sudden  Timing:  Constant  Progression:  Resolved  Chronicity:  New  Relieved by:  None tried  Worsened by:  Nothing  Ineffective treatments:  None tried  Associated symptoms: back pain, cough, diaphoresis and dizziness    Associated symptoms: no abdominal pain, no dysphagia, no fever, no headache, no nausea, no numbness, no shortness of breath, no vomiting and no weakness    Risk factors: diabetes mellitus and male sex        Review of Systems   Unable to perform ROS: Dementia (Limited. Majority per family)   Constitutional: Positive for diaphoresis. Negative for chills and fever.   HENT: Positive for congestion and sinus  pressure. Negative for ear pain, sore throat and trouble swallowing.    Eyes: Negative for pain, redness and visual disturbance.   Respiratory: Positive for cough. Negative for shortness of breath.    Cardiovascular: Positive for chest pain. Negative for leg swelling.   Gastrointestinal: Negative for abdominal pain, blood in stool, constipation, diarrhea, nausea and vomiting.   Genitourinary: Negative for difficulty urinating, dysuria and flank pain.   Musculoskeletal: Positive for back pain. Negative for arthralgias and joint swelling.        Right shoulder pain. Right arm pain.   Skin: Positive for pallor. Negative for rash and wound.   Allergic/Immunologic: Negative.    Neurological: Positive for dizziness and light-headedness. Negative for syncope, weakness, numbness and headaches.   Psychiatric/Behavioral: Negative for confusion.   All other systems reviewed and are negative.      Past Medical History:   Diagnosis Date   • Arthritis    • Diabetes mellitus (CMS/HCC)    • Kidney stone        No Known Allergies    Past Surgical History:   Procedure Laterality Date   • BACK SURGERY     • CHOLECYSTECTOMY         History reviewed. No pertinent family history.    Social History     Social History   • Marital status:      Social History Main Topics   • Smoking status: Former Smoker     Quit date: 2/22/1980   • Alcohol use Yes   • Drug use: No   • Sexual activity: Defer     Other Topics Concern   • Not on file         Objective   Physical Exam   Constitutional: He is oriented to person, place, and time. He appears well-developed and well-nourished. No distress.   HENT:   Head: Normocephalic and atraumatic.   Nose: Nose normal.   Eyes: Conjunctivae are normal. No scleral icterus.   Neck: Normal range of motion. Neck supple.   Cardiovascular: Normal rate, regular rhythm and normal heart sounds.    No murmur heard.  Pulmonary/Chest: Effort normal and breath sounds normal. No respiratory distress.   Abdominal: Soft.  Bowel sounds are normal. There is no tenderness.   Musculoskeletal: Normal range of motion. He exhibits tenderness. He exhibits no edema.   Mild right posterior scapular tenderness.    Neurological: He is alert and oriented to person, place, and time. He has normal strength. No cranial nerve deficit or sensory deficit.   Skin: Skin is warm and dry.   Psychiatric: He has a normal mood and affect. His behavior is normal.   Pleasantly demented.    Nursing note and vitals reviewed.      Procedures         ED Course  ED Course as of Aug 09 2104   Thu Aug 09, 2018   1934 I personally saw and talked with the patient.  Please see the physician assistant's note for further details of the care provided in the findings.  [RS]      ED Course User Index  [RS] Fredy Fierro MD     Re-examined patient several times in ED. Pt resting comfortably, no distress and at baseline per family. Discussed results. They are unaware of prior CVA and discused chronic finding on CT scan. They are agreeable with admission for further evaluation / monitoring due to episode of CP / dizziness and being a poor historian.     Discussed patient with Dr. Fierro who is agreeable with ED course. He personally evaluated patient.     Discussed admission with Dr. Fajardo    Recent Results (from the past 24 hour(s))   Comprehensive Metabolic Panel    Collection Time: 08/09/18  5:41 PM   Result Value Ref Range    Glucose 276 (H) 70 - 100 mg/dL    BUN 12 9 - 23 mg/dL    Creatinine 0.99 0.60 - 1.30 mg/dL    Sodium 134 132 - 146 mmol/L    Potassium 3.8 3.5 - 5.5 mmol/L    Chloride 103 99 - 109 mmol/L    CO2 22.0 20.0 - 31.0 mmol/L    Calcium 8.2 (L) 8.7 - 10.4 mg/dL    Total Protein 6.3 5.7 - 8.2 g/dL    Albumin 3.87 3.20 - 4.80 g/dL    ALT (SGPT) 15 7 - 40 U/L    AST (SGOT) 12 0 - 33 U/L    Alkaline Phosphatase 57 25 - 100 U/L    Total Bilirubin 0.4 0.3 - 1.2 mg/dL    eGFR Non African Amer 73 >60 mL/min/1.73    Globulin 2.4 gm/dL    A/G Ratio 1.6 1.5 - 2.5  g/dL    BUN/Creatinine Ratio 12.1 7.0 - 25.0    Anion Gap 9.0 3.0 - 11.0 mmol/L   Lipase    Collection Time: 08/09/18  5:41 PM   Result Value Ref Range    Lipase 78 (H) 6 - 51 U/L   BNP    Collection Time: 08/09/18  5:41 PM   Result Value Ref Range    BNP 85.0 0.0 - 100.0 pg/mL   Light Blue Top    Collection Time: 08/09/18  5:41 PM   Result Value Ref Range    Extra Tube hold for add-on    Green Top (Gel)    Collection Time: 08/09/18  5:41 PM   Result Value Ref Range    Extra Tube Hold for add-ons.    Lavender Top    Collection Time: 08/09/18  5:41 PM   Result Value Ref Range    Extra Tube hold for add-on    Gold Top - SST    Collection Time: 08/09/18  5:41 PM   Result Value Ref Range    Extra Tube Hold for add-ons.    CBC Auto Differential    Collection Time: 08/09/18  5:41 PM   Result Value Ref Range    WBC 12.76 (H) 3.50 - 10.80 10*3/mm3    RBC 3.57 (L) 4.20 - 5.76 10*6/mm3    Hemoglobin 10.9 (L) 13.1 - 17.5 g/dL    Hematocrit 32.1 (L) 38.9 - 50.9 %    MCV 89.9 80.0 - 99.0 fL    MCH 30.5 27.0 - 31.0 pg    MCHC 34.0 32.0 - 36.0 g/dL    RDW 13.4 11.3 - 14.5 %    RDW-SD 44.0 37.0 - 54.0 fl    MPV 9.5 6.0 - 12.0 fL    Platelets 157 150 - 450 10*3/mm3    Neutrophil % 68.9 41.0 - 71.0 %    Lymphocyte % 17.4 (L) 24.0 - 44.0 %    Monocyte % 11.9 0.0 - 12.0 %    Eosinophil % 1.0 0.0 - 3.0 %    Basophil % 0.4 0.0 - 1.0 %    Immature Grans % 0.4 0.0 - 0.6 %    Neutrophils, Absolute 8.79 (H) 1.50 - 8.30 10*3/mm3    Lymphocytes, Absolute 2.22 0.60 - 4.80 10*3/mm3    Monocytes, Absolute 1.52 (H) 0.00 - 1.00 10*3/mm3    Eosinophils, Absolute 0.13 0.00 - 0.30 10*3/mm3    Basophils, Absolute 0.05 0.00 - 0.20 10*3/mm3    Immature Grans, Absolute 0.05 (H) 0.00 - 0.03 10*3/mm3   POC Troponin, Rapid    Collection Time: 08/09/18  5:48 PM   Result Value Ref Range    Troponin I 0.00 0.00 - 0.07 ng/mL   Urinalysis With Microscopic If Indicated (No Culture) - Urine, Clean Catch    Collection Time: 08/09/18  7:15 PM   Result Value Ref Range     Color, UA Yellow Yellow, Straw    Appearance, UA Cloudy (A) Clear    pH, UA 5.5 5.0 - 8.0    Specific Gravity, UA 1.021 1.001 - 1.030    Glucose, UA >=1000 mg/dL (3+) (A) Negative    Ketones, UA Negative Negative    Bilirubin, UA Negative Negative    Blood, UA Negative Negative    Protein, UA Negative Negative    Leuk Esterase, UA Negative Negative    Nitrite, UA Negative Negative    Urobilinogen, UA 0.2 E.U./dL 0.2 - 1.0 E.U./dL   Urinalysis, Microscopic Only - Urine, Clean Catch    Collection Time: 08/09/18  7:15 PM   Result Value Ref Range    RBC, UA 0-2 None Seen, 0-2 /HPF    WBC, UA 0-2 None Seen, 0-2 /HPF    Bacteria, UA None Seen None Seen, Trace /HPF    Squamous Epithelial Cells, UA 0-2 None Seen, 0-2 /HPF    Hyaline Casts, UA 0-6 0 - 6 /LPF    Methodology Automated Microscopy    POC Troponin, Rapid    Collection Time: 08/09/18  7:58 PM   Result Value Ref Range    Troponin I 0.00 0.00 - 0.07 ng/mL     Note: In addition to lab results from this visit, the labs listed above may include labs taken at another facility or during a different encounter within the last 24 hours. Please correlate lab times with ED admission and discharge times for further clarification of the services performed during this visit.    CT Head Without Contrast   Final Result     Cerebral atrophy, chronic small vessel ischemic disease      Small chronic left cerebellar infarction.      THIS DOCUMENT HAS BEEN ELECTRONICALLY SIGNED BY JED JAQUEZ MD      XR Shoulder 2+ View Right   Preliminary Result   Extensive AC joint arthropathy. No evidence of acute trauma   is seen.       DICTATED:   8/9/2018   EDITED/ls :   8/9/2018           XR Chest 1 View   Preliminary Result   Minimally increased right basilar atelectasis or scarring   compared to previous exam. Stable cardiomegaly and pulmonary venous   hypertension.       DICTATED:   8/9/2018   EDITED/ls :   8/9/2018             Vitals:    08/09/18 1900 08/09/18 1915 08/09/18 1930  08/09/18 2000   BP: 124/63 124/63 120/73 119/56   BP Location:  Right arm     Patient Position:  Sitting     Pulse:  70 63 63   Resp:  20     Temp:       TempSrc:       SpO2: 93% 95% 92% 93%   Weight:       Height:         Medications   sodium chloride 0.9 % flush 10 mL (not administered)   aspirin chewable tablet 324 mg (324 mg Oral Not Given 8/9/18 1806)   sodium chloride 0.9 % bolus 500 mL (0 mL Intravenous Stopped 8/9/18 1914)     ECG/EMG Results (last 24 hours)     Procedure Component Value Units Date/Time    ECG 12 Lead [[id number]] Collected:  08/09/18 1736     Updated:  08/09/18 1737                        MDM    Final diagnoses:   Dizziness   Chest pain, unspecified type   Near syncope       Documentation assistance provided by reba Arauz.  Information recorded by the scribmehnaz was done at my direction and has been verified and validated by me.     Kareem Arauz  08/09/18 3792       Jami Juárez PA  08/09/18 [id number]

## 2018-08-10 PROBLEM — I50.20 SYSTOLIC HEART FAILURE (HCC): Status: ACTIVE | Noted: 2018-01-01

## 2018-08-10 PROBLEM — I95.9 HYPOTENSION: Status: RESOLVED | Noted: 2018-01-01 | Resolved: 2018-01-01

## 2018-08-10 PROBLEM — R07.9 CHEST PAIN: Status: RESOLVED | Noted: 2018-01-01 | Resolved: 2018-01-01

## 2018-08-10 PROBLEM — F02.80 ALZHEIMER DISEASE (HCC): Status: RESOLVED | Noted: 2018-01-01 | Resolved: 2018-01-01

## 2018-08-10 PROBLEM — J40 BRONCHITIS: Status: ACTIVE | Noted: 2018-01-01

## 2018-08-10 PROBLEM — R55 NEAR SYNCOPE: Status: RESOLVED | Noted: 2018-01-01 | Resolved: 2018-01-01

## 2018-08-10 PROBLEM — D64.9 ANEMIA: Status: ACTIVE | Noted: 2018-01-01

## 2018-08-10 PROBLEM — G30.9 ALZHEIMER DISEASE (HCC): Status: RESOLVED | Noted: 2018-01-01 | Resolved: 2018-01-01

## 2018-08-10 NOTE — PROGRESS NOTES
Malnutrition Severity Assessment    Patient Name:  Nico Ferreira  YOB: 1935  MRN: [id number]  Admit Date:  8/9/2018    Patient meets criteria for : Severe malnutrition  Based on patient recent wt loss and poor PO intake, patient meets criteria for severe malnutrition r/t acute illness/injury.  Nutrition Focused Physical Exam not completed to determine fat and muscle wasting, however it is not required for malnutrition diagnosis.   MD please attest and include in pt diagnosis as you deem appropriate.        Malnutrition Type: Acute Illness/Injury Malnutrition     Malnutrition Type (last 8 hours)      Malnutrition Severity Assessment     Row Name 08/10/18 1056       Malnutrition Severity Assessment    Malnutrition Type Acute Illness/Injury Malnutrition    Row Name 08/10/18 1056       Weight Status (Acute)    Weight Loss Severe (>5% / 1 mo)    Row Name 08/10/18 1056       Energy Intake Status (Acute)    Energy Intake Severe (< or equal to 50% / > or equal to 5d)    Row Name 08/10/18 1056       Criteria Met (Must meet criteria for severity in at least 2 of these categories: M Wasting, Fat Loss, Fluid, Secondary Signs, Wt. Status, Intake)    Patient meets criteria for  Severe malnutrition          Electronically signed by:  Tita Olivier RD  08/10/18 10:57 AM

## 2018-08-10 NOTE — H&P
Psychiatric Medicine Services  HISTORY AND PHYSICAL    Patient Name: Nico Ferreira  : 1935  MRN: [id number]  Primary Care Physician: Provider, No Known    Subjective   Subjective     Chief Complaint:  Hypotension, chest pain, near syncope     HPI:  iNco Ferreira is a 83 y.o. male with PMH significant for DM, HLD, and Alzheimer's disease that presents to the ED with complaint of near syncope, hypotension, and chest pain. Per his wife at bedside, earlier this evening, she found him to be diaphoretic and complaining of right arm pain and chest pain. She called EMS and upon their arrival, his BP was found to be 70's/40's. She also notes that he was complaining of dizziness and back pain, and was very pale.   She notes that he had recently been started on an abx for sinusitis, and was also started on a daily allergy medication to help with nasal drainage and cough.   Upon arrival to the ED, the pt has returned to baseline and states he is unsure why he is here. He states he has no chest pain but has right shoulder pain. He does state that he had chest pain earlier but is unable to elaborate on the pain.  Troponin has been negative so far, CT head show old infarct that family was unaware of. Additionally, he does have mild elevation of WBC.   He will be admitted to Hospital Medicine for further evaluation.     Review of Systems   Unable to perform ROS: Dementia   Cardiovascular: Positive for chest pain.        Otherwise 10-system ROS reviewed and is negative except as mentioned in the HPI.    Personal History     Past Medical History:   Diagnosis Date   • Arthritis    • Diabetes mellitus (CMS/HCC)    • Kidney stone        Past Surgical History:   Procedure Laterality Date   • BACK SURGERY     • CHOLECYSTECTOMY         Family History: family history is not on file.     Social History:  reports that he quit smoking about 38 years ago. He does not have any smokeless tobacco history  on file. He reports that he drinks alcohol. He reports that he does not use drugs.  Social History     Social History Narrative   • No narrative on file       Medications:    (Not in a hospital admission)    No Known Allergies    Objective   Objective     Vital Signs:   Temp:  [99.3 °F (37.4 °C)] 99.3 °F (37.4 °C)  Heart Rate:  [63-75] 66  Resp:  [20] 20  BP: ()/(44-73) 133/60        Physical Exam   Constitutional: He appears well-developed and well-nourished. No distress.   HENT:   Head: Normocephalic and atraumatic.   Eyes: Pupils are equal, round, and reactive to light.   Neck: Normal range of motion. Neck supple. No JVD present.   Cardiovascular: Normal rate, regular rhythm, normal heart sounds and intact distal pulses.  Exam reveals no gallop and no friction rub.    No murmur heard.  Pulmonary/Chest: Effort normal and breath sounds normal. No respiratory distress. He has no wheezes. He has no rales.   Non-productive cough noted during exam    Abdominal: Soft. Bowel sounds are normal. He exhibits distension. He exhibits no mass. There is no tenderness. There is no guarding.   Musculoskeletal: Normal range of motion. He exhibits no edema or tenderness.   Neurological: He is alert.   Oriented to self and knows he is at a hospital    Skin: Skin is warm and dry. Capillary refill takes less than 2 seconds. No erythema.   Psychiatric: He has a normal mood and affect. His behavior is normal.   Vitals reviewed.       Results Reviewed:  I have personally reviewed current lab, radiology, and data and agree.      Results from last 7 days  Lab Units 08/09/18  1741   WBC 10*3/mm3 12.76*   HEMOGLOBIN g/dL 10.9*   HEMATOCRIT % 32.1*   PLATELETS 10*3/mm3 157       Results from last 7 days  Lab Units 08/09/18  1741   SODIUM mmol/L 134   POTASSIUM mmol/L 3.8   CHLORIDE mmol/L 103   CO2 mmol/L 22.0   BUN mg/dL 12   CREATININE mg/dL 0.99   GLUCOSE mg/dL 276*   CALCIUM mg/dL 8.2*   ALT (SGPT) U/L 15   AST (SGOT) U/L 12      Estimated Creatinine Clearance: 63.1 mL/min (by C-G formula based on SCr of 0.99 mg/dL).  Brief Urine Lab Results  (Last result in the past 365 days)      Color   Clarity   Blood   Leuk Est   Nitrite   Protein   CREAT   Urine HCG        08/09/18 1915 Yellow Cloudy(A) Negative Negative Negative Negative             BNP   Date Value Ref Range Status   08/09/2018 85.0 0.0 - 100.0 pg/mL Final     Comment:     Results may be falsely decreased if patient taking Biotin.     Imaging Results (last 24 hours)     Procedure Component Value Units Date/Time    XR Chest 1 View [[id number]] Collected:  08/09/18 1844     Updated:  08/09/18 2225    Narrative:          EXAMINATION: XR CHEST 1 VW - 8/9/2018     INDICATION: Chest pain.     COMPARISON: 2/22/2017.     FINDINGS: Heart is mildly enlarged. Vasculature appears upper limits of  normal. Mild coarsening of the right basilar interstitial markings is  stable or minimally increased. Lungs appear stable elsewhere. No  pneumothorax or effusion is seen.           Impression:       Minimally increased right basilar atelectasis or scarring  compared to previous exam. Stable cardiomegaly and pulmonary venous  hypertension.     DICTATED:   8/9/2018  EDITED/ls :   8/9/2018      This report was finalized on 8/9/2018 10:23 PM by DR. Norbert Jacobson MD.       XR Shoulder 2+ View Right [[id number]] Collected:  08/09/18 1914     Updated:  08/09/18 2225    Narrative:          EXAMINATION: XR RIGHT SHOULDER, 2 VIEWS - 8/9/2018      INDICATION: Right shoulder pain.       COMPARISON: None.     FINDINGS: There is extensive AC joint arthropathy with large bone spur  and associated soft tissue calcification. Glenohumeral joint appears  anatomic. Bony structures appear intact.           Impression:       Extensive AC joint arthropathy. No evidence of acute trauma  is seen.     DICTATED:   8/9/2018  EDITED/ls :   8/9/2018      This report was finalized on 8/9/2018 10:23 PM by DR. Norbert Jacobson MD.       CT  Head Without Contrast [[id number]] Collected:  08/09/18 1817     Updated:  08/09/18 1940    Narrative:       EXAM:    CT Head Without Intravenous Contrast    CLINICAL HISTORY:    83 years old, male; Signs and symptoms; Other: See notes; Additional info:   Confusion/delirium, altered loc, unexplained    TECHNIQUE:    Axial computed tomography images of the head/brain without intravenous   contrast.  All CT scans at this facility use at least one of these dose   optimization techniques: automated exposure control; mA and/or kV adjustment   per patient size (includes targeted exams where dose is matched to clinical   indication); or iterative reconstruction.    COMPARISON:    CT HEAD WO CONTRAST 2017-02-22 15:00    FINDINGS:      Brain:  Small chronic left cerebellar infarction.  There are periventricular   and subcortical areas of low attenuation consistent with chronic small vessel   ischemic disease.  This may obscure small areas of ischemia.  No evidence of   hemorrhage or mass effect.  The cortical sulci are enlarged consistent with   cerebral atrophy.      Ventricles:  The ventricles are mildly enlarged consistent with volume loss.      Bones/joints:  Unremarkable.  No acute fracture.      Soft tissues:  Unremarkable.      Sinuses:  The visualized paranasal sinuses are clear.      Mastoid air cells:  Unremarkable.      Orbits:  The visualized orbits are unremarkable.      Impression:         Cerebral atrophy, chronic small vessel ischemic disease    Small chronic left cerebellar infarction.    THIS DOCUMENT HAS BEEN ELECTRONICALLY SIGNED BY JED JAQUEZ MD        Results for orders placed in visit on 06/09/16   SCANNED - ECHOCARDIOGRAM       Assessment/Plan   Assessment / Plan     Hospital Problem List     * (Principal)Chest pain    T2DM (type 2 diabetes mellitus) (CMS/[facility])    BPH (benign prostatic hyperplasia) (Chronic)    Hypotension    Alzheimer disease    Near syncope    Leukocytosis    Anemia             Assessment & Plan:  83 year old male presenting to the ED with complaint of chest pain and near syncope.     Chest pain  -troponin negative x 2, will trend  -ASA given in ED, will continue low dose daily  -EKG in am  -ECHO in am  -CBC, BMP, Lipid panel, TSH in am  -consider stress test in am    Hypotension with Near Syncope  -carotid duplex in am, pt showing remote infarct on CT head  -orthostatic BP  -improved with IVF, wife notes decreased appetite   -fall precautions     Leukocytosis  ? Etiology  -CXR negative,  May consider repeat in am, he does have cough and low grade fever, gentle hydration overnight   -UA negative  ? Chronic steroid use     Anemia  -anemia studies in am    Alzheimer's Disease  -continue home medications    Diabetes Mellitus Type 2  -FSBG ACHS  -ss insulin  -HgA1c     DVT prophylaxis:  -Heparin  scds    CODE STATUS:    Code Status and Medical Interventions:   Ordered at: 08/10/18 0032     Code Status:    CPR     Medical Interventions (Level of Support Prior to Arrest):    Full       Admission Status:  I believe this patient meets OBSERVATION status, however if further evaluation or treatment plans warrant, status may change.  Based upon current information, I predict patient's care encounter to be less than or equal to 2 midnights.      Electronically signed by JOANNA Frias, 08/09/18, 11:54 PM.    Brief Attending Admission Attestation     I have seen and examined the patient, performing an independent face-to-face diagnostic evaluation with plan of care reviewed and developed with the advanced practice clinician (APC).      Brief Summary Statement/HPI:   Nico Ferreira is a 83 y.o. male who has h/o Alzheimer's dementia, HTN, DM2 and lives at home with his family. Patient was brought to ER after a sudden onset right shoulder and chest pain associated with diaphoresis and inability to ambulate. Family called EMS and upon arrival to patient's home, BP was reportedly very low  (70s/40s). Patient was treated with some IVF and his BP improved. Since arrival to the ER, patient has been awake and oriented x 4. BP is normal and no more report of chest pain. CTPA is unremarkable as well as CT scan of the abdomen/pelvis. No report of vomiting or diarrhea. Head CT scan does not reveal acute pathology. Etiology of hypotension is unclear but may be related to medications. Will continue telemetry, trend troponin and obtain 2D echo in the morning. Neurologic checks per protocol.      Attending Physical Exam:  Constitutional: No acute distress, awake, alert and oriented x 4.  Eyes: PERRLA, sclerae anicteric, no conjunctival injection  HENT: NCAT, mucous membranes moist  Neck: Supple, no thyromegaly, no lymphadenopathy, trachea midline  Respiratory: Clear to auscultation bilaterally, nonlabored respirations   Cardiovascular: RRR, no murmurs, rubs, or gallops, palpable pedal pulses bilaterally  Gastrointestinal: Positive bowel sounds, soft, nontender, nondistended  Musculoskeletal: No bilateral ankle edema, no clubbing or cyanosis to extremities  Psychiatric: Appropriate affect, cooperative  Neurologic: Oriented x 3, strength symmetric in all extremities, Cranial Nerves grossly intact, speech clear  Skin: No rashes      Brief Assessment/Plan :  See above for further detailed assessment and plan developed with APC which I have reviewed and/or edited.      Electronically signed by Arvin Castro MD, 08/10/18, 1:15 AM.

## 2018-08-10 NOTE — DISCHARGE SUMMARY
[facility] Medicine Services  SAME DAY ADMISSION & DISCHARGE    Patient Name: Nico Ferreira  : 1935  MRN: [id number]    Date of Admission and Discharge: 2018    Primary Care Physician: ROSSANA Kate MD  Consults     No orders found for last 30 day(s).          Subjective   Presentation and Brief Hospital Summary     Chief Complaint:  Chest pain    HPI and Brief Hospital Course:  Nico Ferreira is a 83 y.o. male with h/o WALLY, CHF, HTN, T2DM, dementia, who presented from home with chest pain . Patient also noted several other complaints on admission, including sinus congestion and some SOA (recent had been given a course of amox from PCP last week), near syncope and hypotension. Chest pain workup was initiated and patient subsequently had EKG with no new changes (old LBBB) and 3 negative troponins. Further w/u was ordered on admission including ECHO which was significant for worsening EF (40-45%) with diastolic dysfunction and LVH; carotid duplex was also ordered which was pending at the time of discharge. Patient was feeling significantly improved with no symptoms on the morning of discharge, requesting to go home.     Given that patient had failed his course of amoxicillin and was still symptomatic with soa, sinus congestion, script for levaquin was given to patient at time of discharge.     Patient was counseled on the findings of his ECHO indicating worsening systolic function, and his medications were reviewed indicating his was on goal-directed medical therapy. We did elect to increase his atorvastatin from 10-20mg qhs with the intent that his PCP can increase further if tolerated. Patient was instructed to discuss DM2 control with his PCP as his A1C >9. No changes were made to his diabetes medications during this admission.     Review of Systems   Gen- No fevers, chills  CV- No chest pain, palpitations  Resp- + cough, dyspnea  GI- No N/V/D, abd pain    Otherwise 10-system ROS reviewed and is negative except as mentioned in the HPI.    Personal History     Past Medical History:   Diagnosis Date   • Alzheimer's dementia    • Anxiety    • Arthritis    • Cataracts, bilateral    • COPD (chronic obstructive pulmonary disease) (CMS/HCC)    • Diabetes mellitus (CMS/HCC)    • Kidney stone    • Neuropathy        Past Surgical History:   Procedure Laterality Date   • BACK SURGERY     • CHOLECYSTECTOMY         Family History: family history is not on file.     Social History:  reports that he quit smoking about 38 years ago. He does not have any smokeless tobacco history on file. He reports that he drinks alcohol. He reports that he does not use drugs.    Medications:  Prescriptions Prior to Admission   Medication Sig Dispense Refill Last Dose   • aspirin 81 MG chewable tablet Chew 81 mg daily.      • cetirizine (zyrTEC) 10 MG tablet Take 10 mg by mouth Daily.      • escitalopram (LEXAPRO) 10 MG tablet Take 10 mg by mouth Daily.      • glipiZIDE (GLUCOTROL) 10 MG tablet Take 10 mg by mouth 2 (two) times a day.      • lisinopril (PRINIVIL,ZESTRIL) 2.5 MG tablet Take 2.5 mg by mouth daily.   Unknown at Unknown time   • memantine (NAMENDA) 10 MG tablet Take 10 mg by mouth 2 (Two) Times a Day.      • metFORMIN (GLUCOPHAGE) 500 MG tablet Take 1,000 mg by mouth Every Night.      • Multiple Vitamins-Minerals (CENTRUM ADULTS) tablet Take  by mouth.      • terazosin (HYTRIN) 5 MG capsule Take 10 mg by mouth every night.      • atenolol (TENORMIN) 50 MG tablet Take 25 mg by mouth Daily.      • Calcium Carbonate-Vitamin D (CALCIUM 500 + D PO) Take 1 tablet by mouth 2 (two) times a day.      • gabapentin (NEURONTIN) 300 MG capsule Take 300 mg by mouth 3 (Three) Times a Day. Takes 2 caps in AM  Takes 2 caps at noon  Takes 3 caps at bedtime      • HYDROcodone-acetaminophen (NORCO) 5-325 MG per tablet Take 1 tablet by mouth every 4 (four) hours as needed.      • levocetirizine (XYZAL) 5  MG tablet Take 5 mg by mouth daily.      • omeprazole (PriLOSEC) 20 MG capsule Take 20 mg by mouth Daily As Needed.   Unknown at Unknown time   • predniSONE (DELTASONE) 10 MG tablet Take 10 mg by mouth daily.      • pregabalin (LYRICA) 50 MG capsule Take 50 mg by mouth 3 (three) times a day.          No Known Allergies    Objective   Objective Findings     Vital Signs:   Temp:  [98.6 °F (37 °C)-99.3 °F (37.4 °C)] 98.6 °F (37 °C)  Heart Rate:  [63-86] 71  Resp:  [16-20] 20  BP: ()/(44-73) 149/66        Physical Exam   See H&P    Results Reviewed:  I have personally reviewed current lab, radiology, and data and agree.      Results from last 7 days  Lab Units 08/10/18  0105   WBC 10*3/mm3 10.90*   HEMOGLOBIN g/dL 11.0*   HEMATOCRIT % 32.5*   PLATELETS 10*3/mm3 137*       Results from last 7 days  Lab Units 08/10/18  0105 08/09/18  1741   SODIUM mmol/L 137 134   POTASSIUM mmol/L 4.0 3.8   CHLORIDE mmol/L 100 103   CO2 mmol/L 24.0 22.0   BUN mg/dL 11 12   CREATININE mg/dL 1.00 0.99   GLUCOSE mg/dL 325* 276*   CALCIUM mg/dL 8.5* 8.2*   ALT (SGPT) U/L  --  15   AST (SGOT) U/L  --  12   TROPONIN I ng/mL 0.009  --      BNP   Date Value Ref Range Status   08/09/2018 85.0 0.0 - 100.0 pg/mL Final     Comment:     Results may be falsely decreased if patient taking Biotin.     No results found for: PHART  Imaging Results (last 24 hours)     Procedure Component Value Units Date/Time    XR Chest PA & Lateral [[id number]] Collected:  08/10/18 1411     Updated:  08/10/18 1412    Narrative:       EXAMINATION: XR CHEST PA AND LATERAL- 08/10/2018     INDICATION: R42-Dizziness and giddiness; R07.9-Chest pain, unspecified;  R55-Syncope and collapse      COMPARISON: NONE     FINDINGS: PA and lateral views of the chest reveal heart to be enlarged.  Mild increased markings seen at the lung bases bilaterally. Degenerative  changes seen within the spine. No definite pleural effusion or  pneumothorax.           Impression:       Increased  markings identified lung bases bilaterally.  Continued follow-up to complete resolution is recommended.     D:  08/10/2018  E:  08/10/2018          XR Chest 1 View [[id number]] Collected:  08/09/18 1844     Updated:  08/09/18 2225    Narrative:          EXAMINATION: XR CHEST 1 VW - 8/9/2018     INDICATION: Chest pain.     COMPARISON: 2/22/2017.     FINDINGS: Heart is mildly enlarged. Vasculature appears upper limits of  normal. Mild coarsening of the right basilar interstitial markings is  stable or minimally increased. Lungs appear stable elsewhere. No  pneumothorax or effusion is seen.           Impression:       Minimally increased right basilar atelectasis or scarring  compared to previous exam. Stable cardiomegaly and pulmonary venous  hypertension.     DICTATED:   8/9/2018  EDITED/ls :   8/9/2018      This report was finalized on 8/9/2018 10:23 PM by DR. Norbert Jacobson MD.       XR Shoulder 2+ View Right [[id number]] Collected:  08/09/18 1914     Updated:  08/09/18 2225    Narrative:          EXAMINATION: XR RIGHT SHOULDER, 2 VIEWS - 8/9/2018      INDICATION: Right shoulder pain.       COMPARISON: None.     FINDINGS: There is extensive AC joint arthropathy with large bone spur  and associated soft tissue calcification. Glenohumeral joint appears  anatomic. Bony structures appear intact.           Impression:       Extensive AC joint arthropathy. No evidence of acute trauma  is seen.     DICTATED:   8/9/2018  EDITED/ls :   8/9/2018      This report was finalized on 8/9/2018 10:23 PM by DR. Norbert Jacobson MD.       CT Head Without Contrast [[id number]] Collected:  08/09/18 1817     Updated:  08/09/18 1940    Narrative:       EXAM:    CT Head Without Intravenous Contrast    CLINICAL HISTORY:    83 years old, male; Signs and symptoms; Other: See notes; Additional info:   Confusion/delirium, altered loc, unexplained    TECHNIQUE:    Axial computed tomography images of the head/brain without intravenous   contrast.  All CT  scans at this facility use at least one of these dose   optimization techniques: automated exposure control; mA and/or kV adjustment   per patient size (includes targeted exams where dose is matched to clinical   indication); or iterative reconstruction.    COMPARISON:    CT HEAD WO CONTRAST 2017-02-22 15:00    FINDINGS:      Brain:  Small chronic left cerebellar infarction.  There are periventricular   and subcortical areas of low attenuation consistent with chronic small vessel   ischemic disease.  This may obscure small areas of ischemia.  No evidence of   hemorrhage or mass effect.  The cortical sulci are enlarged consistent with   cerebral atrophy.      Ventricles:  The ventricles are mildly enlarged consistent with volume loss.      Bones/joints:  Unremarkable.  No acute fracture.      Soft tissues:  Unremarkable.      Sinuses:  The visualized paranasal sinuses are clear.      Mastoid air cells:  Unremarkable.      Orbits:  The visualized orbits are unremarkable.      Impression:         Cerebral atrophy, chronic small vessel ischemic disease    Small chronic left cerebellar infarction.    THIS DOCUMENT HAS BEEN ELECTRONICALLY SIGNED BY JED JAQUEZ MD        Results for orders placed in visit on 06/09/16   SCANNED - ECHOCARDIOGRAM        Order Current Status    Folate RBC In process        Discharge Details        Discharge Medications      New Medications      Instructions Start Date   levoFLOXacin 500 MG tablet  Commonly known as:  LEVAQUIN   500 mg, Oral, Daily         Changes to Medications      Instructions Start Date   atorvastatin 10 MG tablet  Commonly known as:  LIPITOR  What changed:  how much to take   20 mg, Oral, Daily         Continue These Medications      Instructions Start Date   aspirin 81 MG chewable tablet   81 mg, Oral, Daily      atenolol 50 MG tablet  Commonly known as:  TENORMIN   25 mg, Oral, Daily      CENTRUM ADULTS tablet   Oral      escitalopram 10 MG tablet  Commonly known as:   LEXAPRO   10 mg, Oral, Daily      gabapentin 300 MG capsule  Commonly known as:  NEURONTIN   300 mg, Oral, 3 Times Daily, Takes 2 caps in AM Takes 2 caps at noon Takes 3 caps at bedtime      glipiZIDE 10 MG tablet  Commonly known as:  GLUCOTROL   10 mg, Oral, 2 Times Daily      HYDROcodone-acetaminophen 5-325 MG per tablet  Commonly known as:  NORCO   1 tablet, Oral, Every 4 Hours PRN      lisinopril 2.5 MG tablet  Commonly known as:  PRINIVIL,ZESTRIL   2.5 mg, Oral, Daily      memantine 10 MG tablet  Commonly known as:  NAMENDA   10 mg, Oral, 2 Times Daily      metFORMIN 500 MG tablet  Commonly known as:  GLUCOPHAGE   1,000 mg, Oral, Nightly      omeprazole 20 MG capsule  Commonly known as:  priLOSEC   20 mg, Oral, Daily PRN      terazosin 5 MG capsule  Commonly known as:  HYTRIN   10 mg, Oral, Nightly         Stop These Medications    cetirizine 10 MG tablet  Commonly known as:  zyrTEC        ASK your doctor about these medications      Instructions Start Date   CALCIUM 500 + D PO   1 tablet, Oral, 2 Times Daily      levocetirizine 5 MG tablet  Commonly known as:  XYZAL   5 mg, Oral, Daily      predniSONE 10 MG tablet  Commonly known as:  DELTASONE   10 mg, Oral, Daily      pregabalin 50 MG capsule  Commonly known as:  LYRICA   50 mg, Oral, 3 Times Daily               Discharge Disposition:  Home or Self Care    Discharge Diet:  cardiac, diabetic    Discharge Activity:  as tolerated        No future appointments.    Additional Instructions for the Follow-ups that You Need to Schedule     Discharge Follow-up with PCP    As directed      Currently Documented PCP:  ROSSANA Kate MD  PCP Phone Number:  [phone]    Follow Up Details:  [facility] Higginbotham in 1 week if possible. Please send all d/c summaries to Dr. Higginbotham               Time Spent on Discharge:  50 minutes     Electronically signed by Kirti Martinez MD, 08/10/18, 2:24 PM.

## 2018-08-10 NOTE — PROGRESS NOTES
Discharge Planning Assessment  [facility]     Patient Name: Nico Ferreira  MRN: [id number]  Today's Date: 8/10/2018    Admit Date: 8/9/2018          Discharge Needs Assessment     Row Name 08/10/18 0836       Living Environment    Lives With spouse    Current Living Arrangements home/apartment/condo    Primary Care Provided by self    Provides Primary Care For no one    Family Caregiver if Needed spouse    Quality of Family Relationships involved;supportive    Able to Return to Prior Arrangements yes       Resource/Environmental Concerns    Resource/Environmental Concerns none       Transition Planning    Patient/Family Anticipates Transition to home with family    Patient/Family Anticipated Services at Transition none    Transportation Anticipated family or friend will provide       Discharge Needs Assessment    Readmission Within the Last 30 Days no previous admission in last 30 days    Concerns to be Addressed no discharge needs identified    Equipment Currently Used at Home none    Anticipated Changes Related to Illness none    Equipment Needed After Discharge none            Discharge Plan     Row Name 08/10/18 0837       Plan    Plan Home    Patient/Family in Agreement with Plan yes    Plan Comments Spoke with patient in room to initiate discahrge planning.  He lives with his wife in [facility].  He is independent with ADL's and has no DME at home.  He has used [facility] in the past.  Mr. Ferreira does not have RX coverage.  [facility] or [facility].  His plan is to return home with his wife at discharge.  He denies any needs at this time.  CM will continue to follow.  Kasia Bolaños RN [phone]    Final Discharge Disposition Code 01 - home or self-care        Destination     No service coordination in this encounter.      Durable Medical Equipment     No service coordination in this encounter.      Dialysis/Infusion     No service coordination in this  encounter.      Home Medical Care     No service coordination in this encounter.      Social Care     No service coordination in this encounter.        Expected Discharge Date and Time     Expected Discharge Date Expected Discharge Time    Aug 11, 2018               Demographic Summary     Row Name 08/10/18 0836       General Information    Admission Type observation    Arrived From home    Referral Source admission list    Reason for Consult discharge planning    Preferred Language English     Used During This Interaction no    General Information Comments PCP- Sameer Kate            Functional Status     Row Name 08/10/18 0836       Functional Status    Usual Activity Tolerance good    Current Activity Tolerance good       Functional Status, IADL    Medications independent    Meal Preparation independent    Housekeeping independent    Laundry independent    Shopping independent            Psychosocial    No documentation.           Abuse/Neglect    No documentation.           Legal     Row Name 08/10/18 0836       Financial/Legal    Finance Comments Verified with patient that he has Medicare/[employer].  No issues obtaining medications.            Substance Abuse    No documentation.           Patient Forms    No documentation.         Kasia Bolaños RN

## 2018-08-10 NOTE — PROGRESS NOTES
"                  Clinical Nutrition     Nutrition Assessment  Reason for Visit:   Identified at risk by screening criteria  MST score of 4     Patient Name: Nico Ferreira  YOB: 1935  MRN: [id number]  Date of Encounter: 08/10/18 10:45 AM  Admission date: 8/9/2018    Nutrition Assessment     Hospital Problem List  Principal Problem:    Chest pain  Active Problems:    T2DM (type 2 diabetes mellitus) (CMS/HCC)    BPH (benign prostatic hyperplasia)    Hypotension    Alzheimer disease    Near syncope    Leukocytosis    Anemia    PMH: He  has a past medical history of Alzheimer's dementia; Anxiety; Arthritis; Cataracts, bilateral; COPD (chronic obstructive pulmonary disease) (CMS/HCC); Diabetes mellitus (CMS/HCC); Kidney stone; and Neuropathy.   PSxH: He  has a past surgical history that includes Back surgery and Cholecystectomy.     Reported/Observed/Food/Nutrition Related History:     Wife not in the room at time of visit.  Wife reports patient is + for recent weight loss r/t poor appetite.  She feels he probably lost 10-15# in the past 6 weeks.  Decline in appetite as well.  Wife not sure reason for decreased PO intake and feels it might be related to an overall decline.    Attempt to start nutritional supplements @ home per wife; however patient refused.  Wife would like to try with patient while in hospital.     Concerned about patient BG levels as well.  Plans to discuss this with patient PCP.      Wife reports patient will most likely be D/[place] home today.     Anthropometrics     Height: 177.8 cm (70\")  Last filed wt: Weight: 73.5 kg (162 lb 1.6 oz) (08/10/18 0026)  Weight Method: Bed scale    BMI: BMI (Calculated): 24.1  Normal: 18.5-24.9kg/m2    Ideal Body Weight (IBW) (kg): 76.48  Admission wt: 168 #   Method obtained: stated     Weight Change :     UBW: ~ 175#   Weight change: 10-15#     % wt change:  5.7%- 8.6%   Time frame of weight loss: 1 month to 6 weeks      Labs reviewed       Results " from last 7 days  Lab Units 08/10/18  0105 08/09/18  1741   SODIUM mmol/L 137 134   POTASSIUM mmol/L 4.0 3.8   CHLORIDE mmol/L 100 103   CO2 mmol/L 24.0 22.0   BUN mg/dL 11 12   CREATININE mg/dL 1.00 0.99   CALCIUM mg/dL 8.5* 8.2*   BILIRUBIN mg/dL  --  0.4   ALK PHOS U/L  --  57   ALT (SGPT) U/L  --  15   AST (SGOT) U/L  --  12   GLUCOSE mg/dL 325* 276*         Results from last 7 days  Lab Units 08/10/18  0749   GLUCOSE mg/dL 207*       Lab Results  Lab Value Date/Time   HGBA1C 9.20 (H) 08/10/2018 0105   HGBA1C 7.2 (H) 06/06/2016 0425     Medications reviewed   Pertinent:  Lexapro, SSI, Namenda     Nutrition Focused Physical Exam Findings     Unable to perform exam due to: Patient not avaliable at this time.     Current Nutrition Prescription     PO: Diet Regular; Cardiac, Consistent Carbohydrate  No active supplement orders    Intake:  75% x 1 meal     Nutrition Diagnosis     Problem  Nutrition Diagnoses Problem 1   Problem 1:            Malnutrition       Etiology (related to): Recent weight loss; change in appetite in the past month    Signs/Symptoms: 5-8% weight loss in the past month to 6 weeks; decreased PO intake per wife report.     Nutrition Intervention     Interventions Goal    General: Nutrition support treatment    Nutrition Interventions   1.  Follow treatment progress, Care plan reviewed, Supplement provided    Boost  G.C. Daily     Monitor/ Evaluation    Per protocol, PO intake, Supplement intake, Pertinent labs      Will Continue to follow per protocol      Tita Olivier RD  Time Spent: 20min

## 2018-08-11 NOTE — OUTREACH NOTE
Prep Survey      Responses   Facility patient discharged from?  [place]   Is patient eligible?  Yes   Discharge diagnosis  chest pain   Does the patient have one of the following disease processes/diagnoses(primary or secondary)?  Other   Does the patient have Home health ordered?  No   Is there a DME ordered?  No   Prep survey completed?  Yes          Michelle Davis RN

## 2018-08-14 NOTE — OUTREACH NOTE
Medical Week 1 Survey      Responses   Facility patient discharged from?  [place]   Does the patient have one of the following disease processes/diagnoses(primary or secondary)?  Other   Is there a successful TCM telephone encounter documented?  No   Week 1 attempt successful?  Yes   Call start time  1631   Call end time  1640   Discharge diagnosis  chest pain   Is patient permission given to speak with other caregiver?  Yes   List who call center can speak with  wife   Person spoke with today (if not patient) and relationship  wife   Meds reviewed with patient/caregiver?  Yes   Is the patient having any side effects they believe may be caused by any medication additions or changes?  Yes   Side effects comments   Nausea from abx.    Does the patient have all medications ordered at discharge?  Yes   Is the patient taking all medications as directed (includes completed medication regime)?  Yes   Does the patient have a primary care provider?   Yes   Does the patient have an appointment with their PCP within 7 days of discharge?  Yes   Has the patient kept scheduled appointments due by today?  Yes   Psychosocial issues?  No   Comments  Going to try and fit with new CPAP mask   Did the patient receive a copy of their discharge instructions?  Yes   Nursing interventions  Reviewed instructions with patient   What is the patient's perception of their health status since discharge?  Improving   Is the patient/caregiver able to teach back signs and symptoms related to disease process for when to call PCP?  Yes   Is the patient/caregiver able to teach back signs and symptoms related to disease process for when to call 911?  Yes   Is the patient/caregiver able to teach back the hierarchy of who to call/visit for symptoms/problems? PCP, Specialist, Home health nurse, Urgent Care, ED, 911  Yes   Week 1 call completed?  Yes          Niki Patel RN

## 2018-08-24 NOTE — OUTREACH NOTE
Medical Week 2 Survey      Responses   Facility patient discharged from?  [place]   Does the patient have one of the following disease processes/diagnoses(primary or secondary)?  Other   Week 2 attempt successful?  No   Unsuccessful attempts  Attempt 1          Skip Bagley RN

## 2018-08-28 NOTE — OUTREACH NOTE
Medical Week 2 Survey      Responses   Facility patient discharged from?  [place]   Does the patient have one of the following disease processes/diagnoses(primary or secondary)?  Other   Week 2 attempt successful?  Yes   Call start time  1429   Discharge diagnosis  chest pain   Call end time  1433   Is patient permission given to speak with other caregiver?  Yes   List who call center can speak with  wife   Person spoke with today (if not patient) and relationship  wife   Meds reviewed with patient/caregiver?  Yes   Is the patient taking all medications as directed (includes completed medication regime)?  Yes   Has the patient kept scheduled appointments due by today?  No   What is preventing the patient from keeping their appointments?  -- [pt has been on a trip and is just back into town.]   Nursing Interventions  Advised patient to keep appointment, Educated on importance of keeping appointment   Psychosocial issues?  No   What is the patient's perception of their health status since discharge?  Improving   Additional teach back comments  pt states he has been doing well. No CP and sinus congestion is much better. Pt states he was on a trip and has been out of town. He denies c/o at this time and any issues with his meds. Enc. pt to watch diet--sweets and CHO has A1C had been high. Pt states he is watching what he eats.    Week 2 Call Completed?  Yes          Becki Mcihele, RN

## 2018-09-06 NOTE — OUTREACH NOTE
Medical Week 3 Survey      Responses   Facility patient discharged from?  [place]   Does the patient have one of the following disease processes/diagnoses(primary or secondary)?  Other   Week 3 attempt successful?  Yes   Call start time  1141   Call end time  1147   Discharge diagnosis  chest pain   Is patient permission given to speak with other caregiver?  Yes   List who call center can speak with  wife   Person spoke with today (if not patient) and relationship  wife   Meds reviewed with patient/caregiver?  Yes   Is the patient taking all medications as directed (includes completed medication regime)?  Yes   Does the patient have a primary care provider?   Yes   Has the patient kept scheduled appointments due by today?  Yes   Psychosocial issues?  No   What is the patient's perception of their health status since discharge?  Same   Additional teach back comments  Per wife, pt dooing about the same. She is having a hard time getting [facility] [place] [facility] I gave her the numbers for records dept and will notify CM for review.   Week 3 Call Completed?  Yes          Becki Michele RN

## 2018-09-17 NOTE — OUTREACH NOTE
Medical Week 4 Survey      Responses   Facility patient discharged from?  [place]   Does the patient have one of the following disease processes/diagnoses(primary or secondary)?  Other   Week 4 attempt successful?  Yes   Call start time  1003   Call end time  1009   Discharge diagnosis  chest pain   Meds reviewed with patient/caregiver?  Yes   Is the patient taking all medications as directed (includes completed medication regime)?  Yes   Has the patient kept scheduled appointments due by today?  No   Nursing Interventions  Educated on importance of keeping appointment, Advised to reschedule appointment   Comments  pt said that he has not seen any Dr's and has not appts. Strongly encouraged to make appt to see Dr   Is the patient still receiving Home Health Services?  N/A   What is the patient's perception of their health status since discharge?  Improving   Additional teach back comments  pt says he is doing fine   Week 4 Call Completed?  Yes   Would the patient like one additional call?  No   Graduated  Yes   Did the patient feel the follow up calls were helpful during their recovery period?  Yes   Was the number of calls appropriate?  Yes          Indy Moctezuma RN

## 2019-01-01 ENCOUNTER — APPOINTMENT (OUTPATIENT)
Dept: CT IMAGING | Facility: HOSPITAL | Age: 84
End: 2019-01-01

## 2019-01-01 ENCOUNTER — APPOINTMENT (OUTPATIENT)
Dept: ULTRASOUND IMAGING | Facility: HOSPITAL | Age: 84
End: 2019-01-01

## 2019-01-01 ENCOUNTER — APPOINTMENT (OUTPATIENT)
Dept: GENERAL RADIOLOGY | Facility: HOSPITAL | Age: 84
End: 2019-01-01

## 2019-01-01 ENCOUNTER — HOSPITAL ENCOUNTER (INPATIENT)
Facility: HOSPITAL | Age: 84
LOS: 1 days | End: 2019-01-24
Attending: EMERGENCY MEDICINE | Admitting: EMERGENCY MEDICINE

## 2019-01-01 VITALS
HEIGHT: 68 IN | HEART RATE: 38 BPM | BODY MASS INDEX: 24.25 KG/M2 | SYSTOLIC BLOOD PRESSURE: 98 MMHG | WEIGHT: 160 LBS | TEMPERATURE: 97.6 F | OXYGEN SATURATION: 45 % | DIASTOLIC BLOOD PRESSURE: 58 MMHG | RESPIRATION RATE: 17 BRPM

## 2019-01-01 DIAGNOSIS — R11.2 NON-INTRACTABLE VOMITING WITH NAUSEA, UNSPECIFIED VOMITING TYPE: Primary | ICD-10-CM

## 2019-01-01 DIAGNOSIS — R53.1 GENERALIZED WEAKNESS: ICD-10-CM

## 2019-01-01 DIAGNOSIS — N17.9 ACUTE KIDNEY INJURY (HCC): ICD-10-CM

## 2019-01-01 DIAGNOSIS — E86.0 DEHYDRATION: ICD-10-CM

## 2019-01-01 LAB
ALBUMIN SERPL-MCNC: 3.49 G/DL (ref 3.2–4.8)
ALBUMIN SERPL-MCNC: 4.18 G/DL (ref 3.2–4.8)
ALBUMIN/GLOB SERPL: 1.7 G/DL (ref 1.5–2.5)
ALBUMIN/GLOB SERPL: 1.8 G/DL (ref 1.5–2.5)
ALP SERPL-CCNC: 49 U/L (ref 25–100)
ALP SERPL-CCNC: 62 U/L (ref 25–100)
ALT SERPL W P-5'-P-CCNC: 13 U/L (ref 7–40)
ALT SERPL W P-5'-P-CCNC: 15 U/L (ref 7–40)
ANION GAP SERPL CALCULATED.3IONS-SCNC: ABNORMAL MMOL/L (ref 3–11)
AST SERPL-CCNC: 16 U/L (ref 0–33)
AST SERPL-CCNC: 17 U/L (ref 0–33)
BASOPHILS # BLD AUTO: 0.01 10*3/MM3 (ref 0–0.2)
BASOPHILS NFR BLD AUTO: 0.1 % (ref 0–1)
BILIRUB SERPL-MCNC: 0.2 MG/DL (ref 0.3–1.2)
BILIRUB SERPL-MCNC: 0.2 MG/DL (ref 0.3–1.2)
BUN BLD-MCNC: 91 MG/DL (ref 9–23)
BUN BLD-MCNC: 95 MG/DL (ref 9–23)
BUN BLD-MCNC: 96 MG/DL (ref 9–23)
BUN/CREAT SERPL: 11.9 (ref 7–25)
BUN/CREAT SERPL: 12 (ref 7–25)
BUN/CREAT SERPL: 12.5 (ref 7–25)
C DIFF TOX GENS STL QL NAA+PROBE: NOT DETECTED
CA-I SERPL ISE-MCNC: 1 MMOL/L (ref 1.12–1.32)
CALCIUM SPEC-SCNC: 6.6 MG/DL (ref 8.7–10.4)
CALCIUM SPEC-SCNC: 6.7 MG/DL (ref 8.7–10.4)
CALCIUM SPEC-SCNC: 8.1 MG/DL (ref 8.7–10.4)
CHLORIDE SERPL-SCNC: 86 MMOL/L (ref 99–109)
CHLORIDE SERPL-SCNC: 96 MMOL/L (ref 99–109)
CHLORIDE SERPL-SCNC: 98 MMOL/L (ref 99–109)
CO2 SERPL-SCNC: <10 MMOL/L (ref 20–31)
CORTIS AM PEAK SERPL-MCNC: 72.6 MCG/DL (ref 4.3–22.4)
CREAT BLD-MCNC: 7.65 MG/DL (ref 0.6–1.3)
CREAT BLD-MCNC: 7.68 MG/DL (ref 0.6–1.3)
CREAT BLD-MCNC: 7.91 MG/DL (ref 0.6–1.3)
D-LACTATE SERPL-SCNC: 5.5 MMOL/L (ref 0.5–2)
D-LACTATE SERPL-SCNC: 6 MMOL/L (ref 0.5–2)
DEPRECATED RDW RBC AUTO: 43.9 FL (ref 37–54)
DEPRECATED RDW RBC AUTO: 44 FL (ref 37–54)
DEPRECATED RDW RBC AUTO: 45.6 FL (ref 37–54)
EOSINOPHIL # BLD AUTO: 0 10*3/MM3 (ref 0–0.3)
EOSINOPHIL NFR BLD AUTO: 0 % (ref 0–3)
ERYTHROCYTE [DISTWIDTH] IN BLOOD BY AUTOMATED COUNT: 13.6 % (ref 11.3–14.5)
ERYTHROCYTE [DISTWIDTH] IN BLOOD BY AUTOMATED COUNT: 13.9 % (ref 11.3–14.5)
ERYTHROCYTE [DISTWIDTH] IN BLOOD BY AUTOMATED COUNT: 13.9 % (ref 11.3–14.5)
FLUAV SUBTYP SPEC NAA+PROBE: NOT DETECTED
FLUBV RNA ISLT QL NAA+PROBE: NOT DETECTED
GFR SERPL CREATININE-BSD FRML MDRD: 7 ML/MIN/1.73
GLOBULIN UR ELPH-MCNC: 1.9 GM/DL
GLOBULIN UR ELPH-MCNC: 2.4 GM/DL
GLUCOSE BLD-MCNC: 163 MG/DL (ref 70–100)
GLUCOSE BLD-MCNC: 172 MG/DL (ref 70–100)
GLUCOSE BLD-MCNC: 181 MG/DL (ref 70–100)
GLUCOSE BLDC GLUCOMTR-MCNC: 159 MG/DL (ref 70–130)
GLUCOSE BLDC GLUCOMTR-MCNC: 165 MG/DL (ref 70–130)
GLUCOSE BLDC GLUCOMTR-MCNC: 179 MG/DL (ref 70–130)
HBA1C MFR BLD: 7.9 % (ref 4.8–5.6)
HCT VFR BLD AUTO: 30.6 % (ref 38.9–50.9)
HCT VFR BLD AUTO: 31.7 % (ref 38.9–50.9)
HCT VFR BLD AUTO: 33.5 % (ref 38.9–50.9)
HGB BLD-MCNC: 10.4 G/DL (ref 13.1–17.5)
HGB BLD-MCNC: 10.4 G/DL (ref 13.1–17.5)
HGB BLD-MCNC: 11.6 G/DL (ref 13.1–17.5)
HOLD SPECIMEN: NORMAL
IMM GRANULOCYTES # BLD AUTO: 0.07 10*3/MM3 (ref 0–0.03)
IMM GRANULOCYTES NFR BLD AUTO: 0.4 % (ref 0–0.6)
LIPASE SERPL-CCNC: 93 U/L (ref 6–51)
LYMPHOCYTES # BLD AUTO: 1.61 10*3/MM3 (ref 0.6–4.8)
LYMPHOCYTES NFR BLD AUTO: 8.9 % (ref 24–44)
MAGNESIUM SERPL-MCNC: 1.3 MG/DL (ref 1.3–2.7)
MCH RBC QN AUTO: 29.6 PG (ref 27–31)
MCH RBC QN AUTO: 29.8 PG (ref 27–31)
MCH RBC QN AUTO: 30.4 PG (ref 27–31)
MCHC RBC AUTO-ENTMCNC: 32.8 G/DL (ref 32–36)
MCHC RBC AUTO-ENTMCNC: 34 G/DL (ref 32–36)
MCHC RBC AUTO-ENTMCNC: 34.6 G/DL (ref 32–36)
MCV RBC AUTO: 87.7 FL (ref 80–99)
MCV RBC AUTO: 87.7 FL (ref 80–99)
MCV RBC AUTO: 90.3 FL (ref 80–99)
MONOCYTES # BLD AUTO: 1.2 10*3/MM3 (ref 0–1)
MONOCYTES NFR BLD AUTO: 6.6 % (ref 0–12)
NEUTROPHILS # BLD AUTO: 15.28 10*3/MM3 (ref 1.5–8.3)
NEUTROPHILS NFR BLD AUTO: 84.4 % (ref 41–71)
OSMOLALITY SERPL: 310 MOSM/KG (ref 275–295)
PHOSPHATE SERPL-MCNC: 8.7 MG/DL (ref 2.4–5.1)
PLATELET # BLD AUTO: 213 10*3/MM3 (ref 150–450)
PLATELET # BLD AUTO: 226 10*3/MM3 (ref 150–450)
PLATELET # BLD AUTO: 239 10*3/MM3 (ref 150–450)
PMV BLD AUTO: 9.3 FL (ref 6–12)
PMV BLD AUTO: 9.6 FL (ref 6–12)
PMV BLD AUTO: 9.8 FL (ref 6–12)
POTASSIUM BLD-SCNC: 5.2 MMOL/L (ref 3.5–5.5)
POTASSIUM BLD-SCNC: 5.5 MMOL/L (ref 3.5–5.5)
POTASSIUM BLD-SCNC: 5.5 MMOL/L (ref 3.5–5.5)
PROCALCITONIN SERPL-MCNC: 0.63 NG/ML
PROT SERPL-MCNC: 5.4 G/DL (ref 5.7–8.2)
PROT SERPL-MCNC: 6.6 G/DL (ref 5.7–8.2)
RBC # BLD AUTO: 3.49 10*6/MM3 (ref 4.2–5.76)
RBC # BLD AUTO: 3.51 10*6/MM3 (ref 4.2–5.76)
RBC # BLD AUTO: 3.82 10*6/MM3 (ref 4.2–5.76)
SODIUM BLD-SCNC: 124 MMOL/L (ref 132–146)
SODIUM BLD-SCNC: 127 MMOL/L (ref 132–146)
SODIUM BLD-SCNC: 131 MMOL/L (ref 132–146)
TROPONIN I SERPL-MCNC: 0 NG/ML (ref 0–0.07)
WBC NRBC COR # BLD: 15.98 10*3/MM3 (ref 3.5–10.8)
WBC NRBC COR # BLD: 18.1 10*3/MM3 (ref 3.5–10.8)
WBC NRBC COR # BLD: 21.32 10*3/MM3 (ref 3.5–10.8)
WHOLE BLOOD HOLD SPECIMEN: NORMAL
WHOLE BLOOD HOLD SPECIMEN: NORMAL

## 2019-01-01 PROCEDURE — 25010000002 EPINEPHRINE PF 1 MG/10ML SOLUTION PREFILLED SYRINGE: Performed by: INTERNAL MEDICINE

## 2019-01-01 PROCEDURE — 92950 HEART/LUNG RESUSCITATION CPR: CPT

## 2019-01-01 PROCEDURE — 85027 COMPLETE CBC AUTOMATED: CPT | Performed by: NURSE PRACTITIONER

## 2019-01-01 PROCEDURE — 25010000002 MORPHINE PER 10 MG: Performed by: NURSE PRACTITIONER

## 2019-01-01 PROCEDURE — 94799 UNLISTED PULMONARY SVC/PX: CPT

## 2019-01-01 PROCEDURE — 87040 BLOOD CULTURE FOR BACTERIA: CPT | Performed by: EMERGENCY MEDICINE

## 2019-01-01 PROCEDURE — 25010000002 ONDANSETRON PER 1 MG: Performed by: EMERGENCY MEDICINE

## 2019-01-01 PROCEDURE — 93005 ELECTROCARDIOGRAM TRACING: CPT

## 2019-01-01 PROCEDURE — 83605 ASSAY OF LACTIC ACID: CPT | Performed by: EMERGENCY MEDICINE

## 2019-01-01 PROCEDURE — 83735 ASSAY OF MAGNESIUM: CPT | Performed by: NURSE PRACTITIONER

## 2019-01-01 PROCEDURE — 82962 GLUCOSE BLOOD TEST: CPT

## 2019-01-01 PROCEDURE — 76775 US EXAM ABDO BACK WALL LIM: CPT

## 2019-01-01 PROCEDURE — 82330 ASSAY OF CALCIUM: CPT | Performed by: NURSE PRACTITIONER

## 2019-01-01 PROCEDURE — 80053 COMPREHEN METABOLIC PANEL: CPT | Performed by: NURSE PRACTITIONER

## 2019-01-01 PROCEDURE — 63710000001 INSULIN LISPRO (HUMAN) PER 5 UNITS: Performed by: NURSE PRACTITIONER

## 2019-01-01 PROCEDURE — 99291 CRITICAL CARE FIRST HOUR: CPT | Performed by: INTERNAL MEDICINE

## 2019-01-01 PROCEDURE — 25010000002 MAGNESIUM SULFATE IN D5W 1G/100ML (PREMIX) 1-5 GM/100ML-% SOLUTION: Performed by: INTERNAL MEDICINE

## 2019-01-01 PROCEDURE — 25010000002 HEPARIN (PORCINE) PER 1000 UNITS: Performed by: NURSE PRACTITIONER

## 2019-01-01 PROCEDURE — 25010000002 VANCOMYCIN 10 G RECONSTITUTED SOLUTION: Performed by: EMERGENCY MEDICINE

## 2019-01-01 PROCEDURE — 87493 C DIFF AMPLIFIED PROBE: CPT | Performed by: NURSE PRACTITIONER

## 2019-01-01 PROCEDURE — 82533 TOTAL CORTISOL: CPT | Performed by: INTERNAL MEDICINE

## 2019-01-01 PROCEDURE — 85025 COMPLETE CBC W/AUTO DIFF WBC: CPT | Performed by: EMERGENCY MEDICINE

## 2019-01-01 PROCEDURE — 71250 CT THORAX DX C-: CPT

## 2019-01-01 PROCEDURE — 74176 CT ABD & PELVIS W/O CONTRAST: CPT

## 2019-01-01 PROCEDURE — 83930 ASSAY OF BLOOD OSMOLALITY: CPT | Performed by: INTERNAL MEDICINE

## 2019-01-01 PROCEDURE — 84145 PROCALCITONIN (PCT): CPT | Performed by: INTERNAL MEDICINE

## 2019-01-01 PROCEDURE — 71045 X-RAY EXAM CHEST 1 VIEW: CPT

## 2019-01-01 PROCEDURE — 84484 ASSAY OF TROPONIN QUANT: CPT

## 2019-01-01 PROCEDURE — 25010000002 PIPERACILLIN SOD-TAZOBACTAM PER 1 G: Performed by: EMERGENCY MEDICINE

## 2019-01-01 PROCEDURE — 87040 BLOOD CULTURE FOR BACTERIA: CPT | Performed by: NURSE PRACTITIONER

## 2019-01-01 PROCEDURE — 85027 COMPLETE CBC AUTOMATED: CPT | Performed by: INTERNAL MEDICINE

## 2019-01-01 PROCEDURE — 84100 ASSAY OF PHOSPHORUS: CPT | Performed by: NURSE PRACTITIONER

## 2019-01-01 PROCEDURE — 80053 COMPREHEN METABOLIC PANEL: CPT | Performed by: EMERGENCY MEDICINE

## 2019-01-01 PROCEDURE — 87502 INFLUENZA DNA AMP PROBE: CPT | Performed by: NURSE PRACTITIONER

## 2019-01-01 PROCEDURE — 83036 HEMOGLOBIN GLYCOSYLATED A1C: CPT | Performed by: NURSE PRACTITIONER

## 2019-01-01 PROCEDURE — 83690 ASSAY OF LIPASE: CPT | Performed by: EMERGENCY MEDICINE

## 2019-01-01 PROCEDURE — 94640 AIRWAY INHALATION TREATMENT: CPT

## 2019-01-01 PROCEDURE — 93005 ELECTROCARDIOGRAM TRACING: CPT | Performed by: EMERGENCY MEDICINE

## 2019-01-01 PROCEDURE — 99285 EMERGENCY DEPT VISIT HI MDM: CPT

## 2019-01-01 RX ORDER — DEXTROSE MONOHYDRATE 25 G/50ML
25 INJECTION, SOLUTION INTRAVENOUS
Status: DISCONTINUED | OUTPATIENT
Start: 2019-01-01 | End: 2019-01-25 | Stop reason: HOSPADM

## 2019-01-01 RX ORDER — SODIUM CHLORIDE 0.9 % (FLUSH) 0.9 %
10 SYRINGE (ML) INJECTION AS NEEDED
Status: DISCONTINUED | OUTPATIENT
Start: 2019-01-01 | End: 2019-01-25 | Stop reason: HOSPADM

## 2019-01-01 RX ORDER — HEPARIN SODIUM 5000 [USP'U]/ML
5000 INJECTION, SOLUTION INTRAVENOUS; SUBCUTANEOUS EVERY 8 HOURS SCHEDULED
Status: DISCONTINUED | OUTPATIENT
Start: 2019-01-01 | End: 2019-01-25 | Stop reason: HOSPADM

## 2019-01-01 RX ORDER — FAMOTIDINE 10 MG/ML
20 INJECTION, SOLUTION INTRAVENOUS EVERY 12 HOURS SCHEDULED
Status: DISCONTINUED | OUTPATIENT
Start: 2019-01-01 | End: 2019-01-01

## 2019-01-01 RX ORDER — ASPIRIN 81 MG/1
81 TABLET ORAL DAILY
COMMUNITY

## 2019-01-01 RX ORDER — ALBUTEROL SULFATE 90 UG/1
2 AEROSOL, METERED RESPIRATORY (INHALATION) AS NEEDED
COMMUNITY

## 2019-01-01 RX ORDER — GABAPENTIN 300 MG/1
900 CAPSULE ORAL NIGHTLY
COMMUNITY

## 2019-01-01 RX ORDER — IPRATROPIUM BROMIDE AND ALBUTEROL SULFATE 2.5; .5 MG/3ML; MG/3ML
3 SOLUTION RESPIRATORY (INHALATION)
Status: DISCONTINUED | OUTPATIENT
Start: 2019-01-01 | End: 2019-01-25 | Stop reason: HOSPADM

## 2019-01-01 RX ORDER — SODIUM CHLORIDE 0.9 % (FLUSH) 0.9 %
3 SYRINGE (ML) INJECTION EVERY 12 HOURS SCHEDULED
Status: DISCONTINUED | OUTPATIENT
Start: 2019-01-01 | End: 2019-01-25 | Stop reason: HOSPADM

## 2019-01-01 RX ORDER — NICOTINE POLACRILEX 4 MG
15 LOZENGE BUCCAL
Status: DISCONTINUED | OUTPATIENT
Start: 2019-01-01 | End: 2019-01-25 | Stop reason: HOSPADM

## 2019-01-01 RX ORDER — DONEPEZIL HYDROCHLORIDE 10 MG/1
5 TABLET, FILM COATED ORAL DAILY
COMMUNITY

## 2019-01-01 RX ORDER — ONDANSETRON 2 MG/ML
4 INJECTION INTRAMUSCULAR; INTRAVENOUS ONCE
Status: COMPLETED | OUTPATIENT
Start: 2019-01-01 | End: 2019-01-01

## 2019-01-01 RX ORDER — MORPHINE SULFATE 2 MG/ML
1 INJECTION, SOLUTION INTRAMUSCULAR; INTRAVENOUS EVERY 4 HOURS PRN
Status: DISCONTINUED | OUTPATIENT
Start: 2019-01-01 | End: 2019-01-25 | Stop reason: HOSPADM

## 2019-01-01 RX ORDER — SODIUM CHLORIDE 9 MG/ML
125 INJECTION, SOLUTION INTRAVENOUS CONTINUOUS
Status: DISCONTINUED | OUTPATIENT
Start: 2019-01-01 | End: 2019-01-25 | Stop reason: HOSPADM

## 2019-01-01 RX ORDER — GABAPENTIN 300 MG/1
600 CAPSULE ORAL
COMMUNITY

## 2019-01-01 RX ORDER — SODIUM CHLORIDE 0.9 % (FLUSH) 0.9 %
3-10 SYRINGE (ML) INJECTION AS NEEDED
Status: DISCONTINUED | OUTPATIENT
Start: 2019-01-01 | End: 2019-01-25 | Stop reason: HOSPADM

## 2019-01-01 RX ORDER — ATORVASTATIN CALCIUM 40 MG/1
20 TABLET, FILM COATED ORAL DAILY
COMMUNITY

## 2019-01-01 RX ORDER — TERAZOSIN 10 MG/1
10 CAPSULE ORAL NIGHTLY
COMMUNITY

## 2019-01-01 RX ORDER — ATENOLOL 25 MG/1
25 TABLET ORAL DAILY
COMMUNITY

## 2019-01-01 RX ORDER — SODIUM CHLORIDE, SODIUM LACTATE, POTASSIUM CHLORIDE, CALCIUM CHLORIDE 600; 310; 30; 20 MG/100ML; MG/100ML; MG/100ML; MG/100ML
100 INJECTION, SOLUTION INTRAVENOUS CONTINUOUS
Status: DISCONTINUED | OUTPATIENT
Start: 2019-01-01 | End: 2019-01-01

## 2019-01-01 RX ORDER — MAGNESIUM SULFATE 1 G/100ML
1 INJECTION INTRAVENOUS ONCE
Status: COMPLETED | OUTPATIENT
Start: 2019-01-01 | End: 2019-01-01

## 2019-01-01 RX ORDER — FAMOTIDINE 10 MG/ML
20 INJECTION, SOLUTION INTRAVENOUS DAILY
Status: DISCONTINUED | OUTPATIENT
Start: 2019-01-25 | End: 2019-01-25 | Stop reason: HOSPADM

## 2019-01-01 RX ORDER — ALENDRONATE SODIUM 70 MG/1
70 TABLET ORAL
COMMUNITY

## 2019-01-01 RX ORDER — LISINOPRIL 5 MG/1
2.5 TABLET ORAL DAILY
COMMUNITY

## 2019-01-01 RX ORDER — ATROPINE SULFATE 1 MG/ML
INJECTION, SOLUTION INTRAMUSCULAR; INTRAVENOUS; SUBCUTANEOUS
Status: COMPLETED | OUTPATIENT
Start: 2019-01-01 | End: 2019-01-01

## 2019-01-01 RX ORDER — NITROGLYCERIN 0.4 MG/1
0.4 TABLET SUBLINGUAL
COMMUNITY

## 2019-01-01 RX ORDER — ESCITALOPRAM OXALATE 20 MG/1
10 TABLET ORAL NIGHTLY
COMMUNITY

## 2019-01-01 RX ADMIN — ATROPINE SULFATE 1 MG: 1 INJECTION, SOLUTION INTRAMUSCULAR; INTRAVENOUS; SUBCUTANEOUS at 19:49

## 2019-01-01 RX ADMIN — FAMOTIDINE 20 MG: 10 INJECTION, SOLUTION INTRAVENOUS at 09:04

## 2019-01-01 RX ADMIN — INSULIN LISPRO 2 UNITS: 100 INJECTION, SOLUTION INTRAVENOUS; SUBCUTANEOUS at 10:33

## 2019-01-01 RX ADMIN — ONDANSETRON 4 MG: 2 INJECTION INTRAMUSCULAR; INTRAVENOUS at 04:10

## 2019-01-01 RX ADMIN — HEPARIN SODIUM 5000 UNITS: 5000 INJECTION INTRAVENOUS; SUBCUTANEOUS at 17:30

## 2019-01-01 RX ADMIN — EPINEPHRINE 1 MG: 0.1 INJECTION, SOLUTION ENDOTRACHEAL; INTRACARDIAC; INTRAVENOUS at 19:49

## 2019-01-01 RX ADMIN — SODIUM CHLORIDE 1000 ML: 9 INJECTION, SOLUTION INTRAVENOUS at 09:05

## 2019-01-01 RX ADMIN — EPINEPHRINE 1 MG: 0.1 INJECTION, SOLUTION ENDOTRACHEAL; INTRACARDIAC; INTRAVENOUS at 19:52

## 2019-01-01 RX ADMIN — TAZOBACTAM SODIUM AND PIPERACILLIN SODIUM 3.38 G: 375; 3 INJECTION, SOLUTION INTRAVENOUS at 07:02

## 2019-01-01 RX ADMIN — IPRATROPIUM BROMIDE AND ALBUTEROL SULFATE 3 ML: 2.5; .5 SOLUTION RESPIRATORY (INHALATION) at 19:12

## 2019-01-01 RX ADMIN — SODIUM CHLORIDE 2000 ML: 9 INJECTION, SOLUTION INTRAVENOUS at 06:17

## 2019-01-01 RX ADMIN — SODIUM CHLORIDE 125 ML/HR: 9 INJECTION, SOLUTION INTRAVENOUS at 11:51

## 2019-01-01 RX ADMIN — MORPHINE SULFATE 1 MG: 2 INJECTION, SOLUTION INTRAMUSCULAR; INTRAVENOUS at 11:55

## 2019-01-01 RX ADMIN — ATROPINE SULFATE 1 MG: 1 INJECTION, SOLUTION INTRAMUSCULAR; INTRAVENOUS; SUBCUTANEOUS at 19:54

## 2019-01-01 RX ADMIN — ONDANSETRON 4 MG: 2 INJECTION INTRAMUSCULAR; INTRAVENOUS at 07:07

## 2019-01-01 RX ADMIN — IPRATROPIUM BROMIDE AND ALBUTEROL SULFATE 3 ML: 2.5; .5 SOLUTION RESPIRATORY (INHALATION) at 13:21

## 2019-01-01 RX ADMIN — HEPARIN SODIUM 5000 UNITS: 5000 INJECTION INTRAVENOUS; SUBCUTANEOUS at 09:04

## 2019-01-01 RX ADMIN — VANCOMYCIN HYDROCHLORIDE 1500 MG: 10 INJECTION, POWDER, LYOPHILIZED, FOR SOLUTION INTRAVENOUS at 07:36

## 2019-01-01 RX ADMIN — MAGNESIUM SULFATE HEPTAHYDRATE 1 G: 1 INJECTION, SOLUTION INTRAVENOUS at 14:43

## 2019-01-01 RX ADMIN — EPINEPHRINE 1 MG: 0.1 INJECTION, SOLUTION ENDOTRACHEAL; INTRACARDIAC; INTRAVENOUS at 19:55

## 2019-01-01 RX ADMIN — SODIUM CHLORIDE 1000 ML: 9 INJECTION, SOLUTION INTRAVENOUS at 10:33

## 2019-01-01 RX ADMIN — SODIUM CHLORIDE 1000 ML: 9 INJECTION, SOLUTION INTRAVENOUS at 04:13

## 2019-01-24 PROBLEM — N17.9 ACUTE RENAL FAILURE (ARF) (HCC): Status: ACTIVE | Noted: 2019-01-01

## 2019-01-24 PROBLEM — E87.1 HYPONATREMIA: Status: ACTIVE | Noted: 2019-01-01

## 2019-01-24 PROBLEM — J44.9 COPD (CHRONIC OBSTRUCTIVE PULMONARY DISEASE) (HCC): Status: ACTIVE | Noted: 2019-01-01

## 2019-01-24 PROBLEM — N17.9 ACUTE KIDNEY INJURY (HCC): Status: ACTIVE | Noted: 2019-01-01

## 2019-01-24 PROBLEM — R57.1 HYPOVOLEMIC SHOCK (HCC): Status: ACTIVE | Noted: 2019-01-01

## 2019-01-29 LAB
BACTERIA SPEC AEROBE CULT: NORMAL